# Patient Record
Sex: MALE | Race: WHITE | NOT HISPANIC OR LATINO | Employment: OTHER | URBAN - METROPOLITAN AREA
[De-identification: names, ages, dates, MRNs, and addresses within clinical notes are randomized per-mention and may not be internally consistent; named-entity substitution may affect disease eponyms.]

---

## 2022-11-28 ENCOUNTER — APPOINTMENT (OUTPATIENT)
Dept: GENERAL RADIOLOGY | Facility: CLINIC | Age: 42
End: 2022-11-28
Attending: EMERGENCY MEDICINE

## 2022-11-28 ENCOUNTER — HOSPITAL ENCOUNTER (OUTPATIENT)
Facility: CLINIC | Age: 42
Setting detail: OBSERVATION
Discharge: HOME | End: 2022-11-29
Attending: EMERGENCY MEDICINE | Admitting: INTERNAL MEDICINE

## 2022-11-28 DIAGNOSIS — L02.511 ABSCESS OF RIGHT MIDDLE FINGER: ICD-10-CM

## 2022-11-28 DIAGNOSIS — L03.011 CELLULITIS OF RIGHT MIDDLE FINGER: ICD-10-CM

## 2022-11-28 LAB
ANION GAP SERPL CALCULATED.3IONS-SCNC: 4 MMOL/L (ref 3–14)
BASOPHILS # BLD AUTO: 0.1 10E3/UL (ref 0–0.2)
BASOPHILS NFR BLD AUTO: 1 %
BUN SERPL-MCNC: 17 MG/DL (ref 7–30)
CALCIUM SERPL-MCNC: 9.2 MG/DL (ref 8.5–10.1)
CHLORIDE BLD-SCNC: 103 MMOL/L (ref 94–109)
CO2 SERPL-SCNC: 27 MMOL/L (ref 20–32)
CREAT SERPL-MCNC: 0.92 MG/DL (ref 0.66–1.25)
CRP SERPL-MCNC: 6.1 MG/L (ref 0–8)
EOSINOPHIL # BLD AUTO: 0.1 10E3/UL (ref 0–0.7)
EOSINOPHIL NFR BLD AUTO: 1 %
ERYTHROCYTE [DISTWIDTH] IN BLOOD BY AUTOMATED COUNT: 13.3 % (ref 10–15)
GFR SERPL CREATININE-BSD FRML MDRD: >90 ML/MIN/1.73M2
GLUCOSE BLD-MCNC: 117 MG/DL (ref 70–99)
HCT VFR BLD AUTO: 49 % (ref 40–53)
HGB BLD-MCNC: 16.6 G/DL (ref 13.3–17.7)
IMM GRANULOCYTES # BLD: 0.1 10E3/UL
IMM GRANULOCYTES NFR BLD: 0 %
LYMPHOCYTES # BLD AUTO: 1.7 10E3/UL (ref 0.8–5.3)
LYMPHOCYTES NFR BLD AUTO: 13 %
MCH RBC QN AUTO: 29.6 PG (ref 26.5–33)
MCHC RBC AUTO-ENTMCNC: 33.9 G/DL (ref 31.5–36.5)
MCV RBC AUTO: 87 FL (ref 78–100)
MONOCYTES # BLD AUTO: 1.1 10E3/UL (ref 0–1.3)
MONOCYTES NFR BLD AUTO: 9 %
NEUTROPHILS # BLD AUTO: 9.6 10E3/UL (ref 1.6–8.3)
NEUTROPHILS NFR BLD AUTO: 76 %
NRBC # BLD AUTO: 0 10E3/UL
NRBC BLD AUTO-RTO: 0 /100
PLATELET # BLD AUTO: 284 10E3/UL (ref 150–450)
POTASSIUM BLD-SCNC: 3.8 MMOL/L (ref 3.4–5.3)
RBC # BLD AUTO: 5.61 10E6/UL (ref 4.4–5.9)
SARS-COV-2 RNA RESP QL NAA+PROBE: NEGATIVE
SODIUM SERPL-SCNC: 134 MMOL/L (ref 133–144)
WBC # BLD AUTO: 12.6 10E3/UL (ref 4–11)

## 2022-11-28 PROCEDURE — U0005 INFEC AGEN DETEC AMPLI PROBE: HCPCS | Performed by: EMERGENCY MEDICINE

## 2022-11-28 PROCEDURE — 87040 BLOOD CULTURE FOR BACTERIA: CPT | Performed by: EMERGENCY MEDICINE

## 2022-11-28 PROCEDURE — G0378 HOSPITAL OBSERVATION PER HR: HCPCS

## 2022-11-28 PROCEDURE — 250N000013 HC RX MED GY IP 250 OP 250 PS 637: Performed by: INTERNAL MEDICINE

## 2022-11-28 PROCEDURE — 96367 TX/PROPH/DG ADDL SEQ IV INF: CPT

## 2022-11-28 PROCEDURE — 99285 EMERGENCY DEPT VISIT HI MDM: CPT | Mod: 25

## 2022-11-28 PROCEDURE — 250N000009 HC RX 250: Performed by: PHYSICIAN ASSISTANT

## 2022-11-28 PROCEDURE — 250N000011 HC RX IP 250 OP 636: Performed by: INTERNAL MEDICINE

## 2022-11-28 PROCEDURE — 36415 COLL VENOUS BLD VENIPUNCTURE: CPT | Performed by: EMERGENCY MEDICINE

## 2022-11-28 PROCEDURE — 96366 THER/PROPH/DIAG IV INF ADDON: CPT

## 2022-11-28 PROCEDURE — 73140 X-RAY EXAM OF FINGER(S): CPT | Mod: RT

## 2022-11-28 PROCEDURE — C9803 HOPD COVID-19 SPEC COLLECT: HCPCS

## 2022-11-28 PROCEDURE — 250N000011 HC RX IP 250 OP 636: Performed by: EMERGENCY MEDICINE

## 2022-11-28 PROCEDURE — 250N000013 HC RX MED GY IP 250 OP 250 PS 637: Performed by: EMERGENCY MEDICINE

## 2022-11-28 PROCEDURE — 258N000003 HC RX IP 258 OP 636: Performed by: EMERGENCY MEDICINE

## 2022-11-28 PROCEDURE — 99220 PR INITIAL OBSERVATION CARE,LEVEL III: CPT | Performed by: INTERNAL MEDICINE

## 2022-11-28 PROCEDURE — 85014 HEMATOCRIT: CPT | Performed by: EMERGENCY MEDICINE

## 2022-11-28 PROCEDURE — 96365 THER/PROPH/DIAG IV INF INIT: CPT

## 2022-11-28 PROCEDURE — 80048 BASIC METABOLIC PNL TOTAL CA: CPT | Performed by: EMERGENCY MEDICINE

## 2022-11-28 PROCEDURE — 86140 C-REACTIVE PROTEIN: CPT | Performed by: EMERGENCY MEDICINE

## 2022-11-28 PROCEDURE — 94640 AIRWAY INHALATION TREATMENT: CPT

## 2022-11-28 RX ORDER — ACETAMINOPHEN 650 MG/1
650 SUPPOSITORY RECTAL EVERY 6 HOURS PRN
Status: DISCONTINUED | OUTPATIENT
Start: 2022-11-28 | End: 2022-11-29 | Stop reason: HOSPADM

## 2022-11-28 RX ORDER — AMOXICILLIN 250 MG
1 CAPSULE ORAL 2 TIMES DAILY PRN
Status: DISCONTINUED | OUTPATIENT
Start: 2022-11-28 | End: 2022-11-29 | Stop reason: HOSPADM

## 2022-11-28 RX ORDER — VANCOMYCIN HYDROCHLORIDE 1 G/200ML
1000 INJECTION, SOLUTION INTRAVENOUS EVERY 12 HOURS
Status: DISCONTINUED | OUTPATIENT
Start: 2022-11-29 | End: 2022-11-28 | Stop reason: CLARIF

## 2022-11-28 RX ORDER — PIPERACILLIN SODIUM, TAZOBACTAM SODIUM 3; .375 G/15ML; G/15ML
3.38 INJECTION, POWDER, LYOPHILIZED, FOR SOLUTION INTRAVENOUS EVERY 6 HOURS
Status: DISCONTINUED | OUTPATIENT
Start: 2022-11-28 | End: 2022-11-29 | Stop reason: HOSPADM

## 2022-11-28 RX ORDER — AMOXICILLIN 250 MG
2 CAPSULE ORAL 2 TIMES DAILY PRN
Status: DISCONTINUED | OUTPATIENT
Start: 2022-11-28 | End: 2022-11-29 | Stop reason: HOSPADM

## 2022-11-28 RX ORDER — ONDANSETRON 4 MG/1
4 TABLET, ORALLY DISINTEGRATING ORAL EVERY 6 HOURS PRN
Status: DISCONTINUED | OUTPATIENT
Start: 2022-11-28 | End: 2022-11-29 | Stop reason: HOSPADM

## 2022-11-28 RX ORDER — ONDANSETRON 2 MG/ML
4 INJECTION INTRAMUSCULAR; INTRAVENOUS EVERY 6 HOURS PRN
Status: DISCONTINUED | OUTPATIENT
Start: 2022-11-28 | End: 2022-11-29 | Stop reason: HOSPADM

## 2022-11-28 RX ORDER — SULFAMETHOXAZOLE/TRIMETHOPRIM 800-160 MG
2 TABLET ORAL ONCE
Status: COMPLETED | OUTPATIENT
Start: 2022-11-28 | End: 2022-11-28

## 2022-11-28 RX ORDER — ALBUTEROL SULFATE 90 UG/1
2 AEROSOL, METERED RESPIRATORY (INHALATION) EVERY 4 HOURS PRN
Status: DISCONTINUED | OUTPATIENT
Start: 2022-11-28 | End: 2022-11-29 | Stop reason: HOSPADM

## 2022-11-28 RX ORDER — GINSENG 100 MG
CAPSULE ORAL DAILY PRN
Status: DISCONTINUED | OUTPATIENT
Start: 2022-11-28 | End: 2022-11-29 | Stop reason: HOSPADM

## 2022-11-28 RX ORDER — CEFTAZIDIME 2 G/1
2 INJECTION, POWDER, FOR SOLUTION INTRAVENOUS ONCE
Status: DISCONTINUED | OUTPATIENT
Start: 2022-11-28 | End: 2022-11-28

## 2022-11-28 RX ORDER — SULFAMETHOXAZOLE/TRIMETHOPRIM 800-160 MG
1 TABLET ORAL 2 TIMES DAILY
Qty: 14 TABLET | Refills: 0 | Status: SHIPPED | OUTPATIENT
Start: 2022-11-28 | End: 2022-11-29

## 2022-11-28 RX ORDER — PROCHLORPERAZINE MALEATE 10 MG
10 TABLET ORAL EVERY 6 HOURS PRN
Status: DISCONTINUED | OUTPATIENT
Start: 2022-11-28 | End: 2022-11-29 | Stop reason: HOSPADM

## 2022-11-28 RX ORDER — PROCHLORPERAZINE 25 MG
25 SUPPOSITORY, RECTAL RECTAL EVERY 12 HOURS PRN
Status: DISCONTINUED | OUTPATIENT
Start: 2022-11-28 | End: 2022-11-29 | Stop reason: HOSPADM

## 2022-11-28 RX ORDER — POLYETHYLENE GLYCOL 3350 17 G/17G
17 POWDER, FOR SOLUTION ORAL DAILY PRN
Status: DISCONTINUED | OUTPATIENT
Start: 2022-11-28 | End: 2022-11-29 | Stop reason: HOSPADM

## 2022-11-28 RX ORDER — CEFTAZIDIME 2 G/1
2 INJECTION, POWDER, FOR SOLUTION INTRAVENOUS EVERY 8 HOURS
Status: CANCELLED | OUTPATIENT
Start: 2022-11-28

## 2022-11-28 RX ORDER — ALBUTEROL SULFATE 90 UG/1
2 AEROSOL, METERED RESPIRATORY (INHALATION) EVERY 6 HOURS PRN
COMMUNITY

## 2022-11-28 RX ORDER — PIPERACILLIN SODIUM, TAZOBACTAM SODIUM 4; .5 G/20ML; G/20ML
4.5 INJECTION, POWDER, LYOPHILIZED, FOR SOLUTION INTRAVENOUS ONCE
Status: COMPLETED | OUTPATIENT
Start: 2022-11-28 | End: 2022-11-28

## 2022-11-28 RX ORDER — IBUPROFEN 600 MG/1
600 TABLET, FILM COATED ORAL ONCE
Status: COMPLETED | OUTPATIENT
Start: 2022-11-28 | End: 2022-11-28

## 2022-11-28 RX ORDER — ACETAMINOPHEN 325 MG/1
650 TABLET ORAL EVERY 6 HOURS PRN
Status: DISCONTINUED | OUTPATIENT
Start: 2022-11-28 | End: 2022-11-29 | Stop reason: HOSPADM

## 2022-11-28 RX ADMIN — SULFAMETHOXAZOLE AND TRIMETHOPRIM 2 TABLET: 800; 160 TABLET ORAL at 13:37

## 2022-11-28 RX ADMIN — PIPERACILLIN AND TAZOBACTAM 4.5 G: 4; .5 INJECTION, POWDER, FOR SOLUTION INTRAVENOUS at 14:02

## 2022-11-28 RX ADMIN — ACETAMINOPHEN 650 MG: 325 TABLET, FILM COATED ORAL at 21:46

## 2022-11-28 RX ADMIN — AMOXICILLIN AND CLAVULANATE POTASSIUM 1 TABLET: 875; 125 TABLET, FILM COATED ORAL at 12:28

## 2022-11-28 RX ADMIN — VANCOMYCIN HYDROCHLORIDE 2000 MG: 10 INJECTION, POWDER, LYOPHILIZED, FOR SOLUTION INTRAVENOUS at 14:51

## 2022-11-28 RX ADMIN — IBUPROFEN 600 MG: 600 TABLET ORAL at 12:29

## 2022-11-28 RX ADMIN — BACITRACIN: 500 OINTMENT TOPICAL at 23:23

## 2022-11-28 RX ADMIN — PIPERACILLIN AND TAZOBACTAM 3.38 G: 3; .375 INJECTION, POWDER, FOR SOLUTION INTRAVENOUS at 19:31

## 2022-11-28 ASSESSMENT — ENCOUNTER SYMPTOMS
WOUND: 1
FEVER: 0

## 2022-11-28 ASSESSMENT — ACTIVITIES OF DAILY LIVING (ADL)
ADLS_ACUITY_SCORE: 35
ADLS_ACUITY_SCORE: 33
ADLS_ACUITY_SCORE: 35

## 2022-11-28 NOTE — ED PROVIDER NOTES
History   Chief Complaint:  Hand Pain       The history is provided by the patient.      Jerald Padron is a right handed 42 year old otherwise healthy male who presents with right upper middle finger swelling, erythema, and pain. He first noticed the swelling on Thursday but it was much smaller and less severe. He states he sat in the hot tub with his hand submerged, soaked his finger in warm water, and applied cream all without any relief. As the swelling and erythema continued to spread he claims he tried to poke at it with a needle to try to drain it. Now the pressure in his finger is so great that it is causing diffuse right hand pain. Denies significant purulent exudate when attempting to drain finger. Denies fever. Has not applied ice.     Review of Systems   Constitutional: Negative for fever.   Skin: Positive for wound (Right middle finger ).   All other systems reviewed and are negative.    Allergies:  The patient has no known allergies.     Medications:  Albuterol inhaler     Past Medical History:     Asthma   GERD  Deviated nasal septum     Past Surgical History:    Maybeury teeth extraction     Family History:    This patient has no significant family history.     Social History:  The patient presents to the ED alone.  PCP: No Ref-Primary, Physician     Physical Exam     Patient Vitals for the past 24 hrs:   BP Temp Temp src Pulse Resp SpO2   11/28/22 1149 (!) 149/89 -- -- -- -- --   11/28/22 1148 -- 97.6  F (36.4  C) Temporal 77 18 98 %       Physical Exam  General: Patient is alert and normal appearing.  HEENT: Head atraumatic   Chest: Heart regular rate and rhythm.   Lungs: Equal clear to auscultation with no wheeze or rales  Abdomen: Soft, non tender, nondistended, normal bowel sounds  Back: No costovertebral angle tenderness, no midline C, T or L spine tenderness  Neuro: Grossly nonfocal, normal speech, strength equal bilaterally, CN 2-12 intact  Extremities: Right middle finger with fusiform  swelling and significant erythema and tenderness along the flexor tendon as well as the distal finger pulp with what appears to be an abscess on the lateral aspect of the finger.  Cap refill at the distal finger is less than 2 seconds.  Light touch sensation is intact.  In addition on the dorsal aspect of the hand there is some tracking redness up the hand.  Compartments are soft.  Skin: Warm and dry with no rash.       Emergency Department Course   Imaging:  XR Finger Right G/E 2 Views   Final Result   IMPRESSION: No fracture. No degenerative changes. No evidence of   osteomyelitis.      ADRIAN DEAN MD            SYSTEM ID:  [id number]        Report per radiology    Laboratory:  Labs Ordered and Resulted from Time of ED Arrival to Time of ED Departure   CBC WITH PLATELETS AND DIFFERENTIAL - Abnormal       Result Value    WBC Count 12.6 (*)     RBC Count 5.61      Hemoglobin 16.6      Hematocrit 49.0      MCV 87      MCH 29.6      MCHC 33.9      RDW 13.3      Platelet Count 284      % Neutrophils 76      % Lymphocytes 13      % Monocytes 9      % Eosinophils 1      % Basophils 1      % Immature Granulocytes 0      NRBCs per 100 WBC 0      Absolute Neutrophils 9.6 (*)     Absolute Lymphocytes 1.7      Absolute Monocytes 1.1      Absolute Eosinophils 0.1      Absolute Basophils 0.1      Absolute Immature Granulocytes 0.1      Absolute NRBCs 0.0     CRP INFLAMMATION   BASIC METABOLIC PANEL   COVID-19 VIRUS (CORONAVIRUS) BY PCR   BLOOD CULTURE   BLOOD CULTURE        Procedures    Incision and Drainage     Procedure: Incision and Drainage     Consent: Verbal    Indication: Abscess    Location: Extremity, upper    Size: 1 cm    Ultrasound Guidance: No    Preparation: Povidone-iodine     Procedure Detail:    Aspiration: No  Incision Type: Stab  Scalpel: 11  Wound Management: Left open   Packing: None     Patient Status: The patient tolerated the procedure well: Yes. There were no complications.     Emergency  Department Course:       Reviewed:  I reviewed nursing notes, vitals, past medical history and Care Everywhere    Assessments:  1213 I obtained history and examined the patient as noted above.   1220 I numbed the patient's finger   1230 I attempted to drain the finger.  1301 I left a message at [facility] hand specialty for patient follow up.  1315 I reassessed patient. I noted that he became near syncopal after sitting in the room.  Patient with continued tenderness along the flexor tendon as well as tracking up the dorsum of hand with erythema    Consults:  1344 I spoke with [facility] about patient symptoms and plan of care.   1345 I consulted with Dr. Farmer, hospitalist, regarding the patient's history and presentation here in the emergency department who accepted the patient for admission.    Interventions:  1228 Augmentin 875-125 mg oral  1229 Ibuprofen 600 mg oral   1337 Bactrim 800-160 mg x 2 oral   1350 Ceftaz 2g  1350 Vancomycin    Disposition:  The patient was admitted to the hospital under the care of Dr. Farmer.     Impression & Plan     Medical Decision Making:  Patient is a 42-year-old otherwise healthy male who presents to the emergency department with dominant hand right middle finger infection.  Patient attempted to drain himself at home by sticking needles in it as well as trying to open it in a hot tub.  Now with fusiform swelling, tenderness along the flexor tendon as well as erythema on the dorsal aspect of the hand and pain into the surrounding digits.  Compartments appear soft.  Fingers neurovascularly intact with light touch sensation and cap refill intact in the distal finger.  Attempted incision and drainage was made without significant pus resulted.  X-ray without evidence of osteomyelitis.  Patient with mild leukocytosis but afebrile.  Cultures are pending.  Discussed with orthopedics on-call and will plan to admit for IV antibiotics and possible washout.  Need their specialty evaluation.   Discussed with the hospitalist who kindly agrees to except the patient for admission    Diagnosis:    ICD-10-CM    1. Abscess of right middle finger  L02.511       2. Cellulitis of right middle finger  L03.011           Scribe Disclosure:  I, Margaux Garcia, am serving as a scribe at 12:17 PM on 11/28/2022 to document services personally performed by Polly Coon MD based on my observations and the provider's statements to me.        Polly Coon MD  11/28/22 0629

## 2022-11-28 NOTE — ED NOTES
"Rapid Assessment Note    History:   Jerald Padron is a right-handed 42 year old male who presents with swelling and erythema to the right third finger. He states that it started as a \"zit\" that he picked at and later developed erythema and swelling. He has soaked it in salt baths without relief. He denies any drainage.    Exam:   General:  Alert, interactive  Cardiovascular:  Well perfused  Lungs:  No respiratory distress, no accessory muscle use  Neuro:  Moving all 4 extremities  Skin:  Warm, dry. Redness, swelling, and pustular lesions on the distal aspect of the right third finger.  Psych:  Normal affect      Plan of Care:   I evaluated the patient and developed an initial plan of care. I discussed this plan and explained that I, or one of my partners, would be returning to complete the evaluation.         I, Jami Tran, am serving as a scribe to document services personally performed by Ariana Self MD, based on my observations and the provider's statements to me.    11/28/2022  [employer] [employer]    Portions of this medical record were completed by a scribe. UPON MY REVIEW AND AUTHENTICATION BY ELECTRONIC SIGNATURE, this confirms (a) I performed the applicable clinical services, and (b) the record is accurate.        Ariana Self MD  11/28/22 1200    "

## 2022-11-28 NOTE — CONSULTS
[facility]    Orthopedic Consultation    Jerald Padron MRN# [id number]   Age: 42 year old YOB: 1980     Date of Admission: 11/28/2022    Reason for consult: Right long distal finger infection       Requesting provider: Call from ED       Level of consult: Consult, follow and place orders           Assessment and Plan:   Assessment:   Right long finger distal finger infection      Plan:   Continue IV Abx  Begin hibiclens/warm water soaks bid  Elevate right hand when at rest  NPO midnight and recheck in am            Chief Complaint:   Right finger pain          History of Present Illness:   Mr. Padron is a 42-year-old male patient presented to the ED with right long finger pain.  The patient was in his usual state of health until when he woke up on Thursday morning (11/24) with right middle finger swelling, erythema and pain. He notes there was a small pustule on the side of his finger.   He tried to treat this himself by soaking his hand in warm water and also did poke at it with a needle to try to drain it.  The pain and redness progressively became worse over the last few days therefore presented to the ED.    The ED provider, Dr Coon, did attempt an I&D at bedside with little purulence.             Past Medical History:   No past medical history on file.          Past Surgical History:   No past surgical history on file.          Social History:     Social History     Tobacco Use     Smoking status: Not on file     Smokeless tobacco: Not on file   Substance Use Topics     Alcohol use: Not on file             Family History:   No family history on file.          Immunizations:     VACCINE/DOSE   Diptheria   DPT   DTAP   HBIG   Hepatitis A   Hepatitis B   HIB   Influenza   Measles   Meningococcal   MMR   Mumps   Pneumococcal   Polio   Rubella   Small Pox   TDAP   Varicella   Zoster             Allergies:   No Known Allergies          Medications:     Current  Facility-Administered Medications   Medication     vancomycin (VANCOCIN) 2,000 mg in 0.9% NaCl 500 mL intermittent infusion     Current Outpatient Medications   Medication Sig     albuterol (PROAIR HFA/PROVENTIL HFA/VENTOLIN HFA) 108 (90 Base) MCG/ACT inhaler Inhale 2 puffs into the lungs every 6 hours as needed for shortness of breath / dyspnea or wheezing     amoxicillin-clavulanate (AUGMENTIN) 875-125 MG tablet Take 1 tablet by mouth 2 times daily for 7 days     sulfamethoxazole-trimethoprim (BACTRIM DS) 800-160 MG tablet Take 1 tablet by mouth 2 times daily for 7 days             Review of Systems:   ROS:  10 point ROS neg other than the symptoms noted above in the HPI.            Physical Exam:   All vitals have been reviewed  Patient Vitals for the past 24 hrs:   BP Temp Temp src Pulse Resp SpO2 Weight   11/28/22 1408 -- -- -- -- -- -- 81.7 kg (180 lb 3.2 oz)   11/28/22 1149 (!) 149/89 -- -- -- -- -- --   11/28/22 1148 -- 97.6  F (36.4  C) Temporal 77 18 98 % --     No intake or output data in the 24 hours ending 11/28/22 1511      Physical Exam   Temp: 97.6  F (36.4  C) Temp src: Temporal BP: (!) 149/89 Pulse: 77   Resp: 18 SpO2: 98 % O2 Device: None (Room air)    Vital Signs with Ranges  Temp:  [97.6  F (36.4  C)] 97.6  F (36.4  C)  Pulse:  [77] 77  Resp:  [18] 18  BP: (149)/(89) 149/89  SpO2:  [98 %] 98 %  180 lbs 3.2 oz    Constitutional: Alert, following commands.  HEENT: Head atraumatic normocephalic. Pupils equal round and reactive to light.  Respiratory: Unlabored breathing no audible wheeze  Cardiovascular: Regular rate and rhythm per pulses  GI: Abdomen non-distended.  Lymph/Hematologic: No lymphadenopathy in areas examined  Genitourinary:  No carrington  Skin: No rashes, no cyanosis, no edema.  Musculoskeletal: On exam of the right long finger, erythema along the ulnar aspect of the finger distally with small pustule that has been lanced.  Patient able to flex and the DIP and PIP joints with mild  discomfort.  Minimal pain with resisted finger flexion.  TTP along the distal finger.  Sensation intact and equal.  Brisk cap refill.  No significant lymphangitis present.    Neurologic: normal without focal findings, Alert and oriented             Data:   All laboratory data reviewed  Results for orders placed or performed during the hospital encounter of 11/28/22   XR Finger Right G/E 2 Views     Status: None    Narrative    XR FINGER RIGHT G/E 2 VIEWS 11/28/2022 1:26 PM    HISTORY: swelling, finger infection    COMPARISON: None.      Impression    IMPRESSION: No fracture. No degenerative changes. No evidence of  osteomyelitis.    ADRIAN DEAN MD         SYSTEM ID:  [id number]   CRP inflammation     Status: Normal   Result Value Ref Range    CRP Inflammation 6.1 0.0 - 8.0 mg/L   Basic metabolic panel     Status: Abnormal   Result Value Ref Range    Sodium 134 133 - 144 mmol/L    Potassium 3.8 3.4 - 5.3 mmol/L    Chloride 103 94 - 109 mmol/L    Carbon Dioxide (CO2) 27 20 - 32 mmol/L    Anion Gap 4 3 - 14 mmol/L    Urea Nitrogen 17 7 - 30 mg/dL    Creatinine 0.92 0.66 - 1.25 mg/dL    Calcium 9.2 8.5 - 10.1 mg/dL    Glucose 117 (H) 70 - 99 mg/dL    GFR Estimate >90 >60 mL/min/1.73m2   CBC with platelets and differential     Status: Abnormal   Result Value Ref Range    WBC Count 12.6 (H) 4.0 - 11.0 10e3/uL    RBC Count 5.61 4.40 - 5.90 10e6/uL    Hemoglobin 16.6 13.3 - 17.7 g/dL    Hematocrit 49.0 40.0 - 53.0 %    MCV 87 78 - 100 fL    MCH 29.6 26.5 - 33.0 pg    MCHC 33.9 31.5 - 36.5 g/dL    RDW 13.3 10.0 - 15.0 %    Platelet Count 284 150 - 450 10e3/uL    % Neutrophils 76 %    % Lymphocytes 13 %    % Monocytes 9 %    % Eosinophils 1 %    % Basophils 1 %    % Immature Granulocytes 0 %    NRBCs per 100 WBC 0 <1 /100    Absolute Neutrophils 9.6 (H) 1.6 - 8.3 10e3/uL    Absolute Lymphocytes 1.7 0.8 - 5.3 10e3/uL    Absolute Monocytes 1.1 0.0 - 1.3 10e3/uL    Absolute Eosinophils 0.1 0.0 - 0.7 10e3/uL    Absolute  Basophils 0.1 0.0 - 0.2 10e3/uL    Absolute Immature Granulocytes 0.1 <=0.4 10e3/uL    Absolute NRBCs 0.0 10e3/uL   CBC with platelets differential     Status: Abnormal    Narrative    The following orders were created for panel order CBC with platelets differential.  Procedure                               Abnormality         Status                     ---------                               -----------         ------                     CBC with platelets and d...[[id number]]  Abnormal            Final result                 Please view results for these tests on the individual orders.          Attestation:  I have reviewed today's vital signs, notes, medications, labs and imaging with Dr. Vasquez.  Amount of time performed on this consult: 45 minutes.    Fernanda Perez PA-C        Private Vehicle

## 2022-11-28 NOTE — PHARMACY-ADMISSION MEDICATION HISTORY
Pharmacy Medication History  Admission medication history interview status for the 11/28/2022  admission is complete. See EPIC admission navigator for prior to admission medications     Location of Interview: Patient room  Medication history sources: Patient      Medication reconciliation completed by provider prior to medication history? No    Time spent in this activity: 5 minutes    Prior to Admission medications    Medication Sig Last Dose Taking? Auth Provider Long Term End Date   albuterol (PROAIR HFA/PROVENTIL HFA/VENTOLIN HFA) 108 (90 Base) MCG/ACT inhaler Inhale 2 puffs into the lungs every 6 hours as needed for shortness of breath / dyspnea or wheezing prn med Yes Unknown, Entered By History Yes    amoxicillin-clavulanate (AUGMENTIN) 875-125 MG tablet Take 1 tablet by mouth 2 times daily for 7 days  Yes Polly Coon MD  12/5/22   sulfamethoxazole-trimethoprim (BACTRIM DS) 800-160 MG tablet Take 1 tablet by mouth 2 times daily for 7 days  Yes Polly Coon MD  12/5/22       The information provided in this note is only as accurate as the sources available at the time of update(s)

## 2022-11-28 NOTE — ED NOTES
"[facility]  ED Nurse Handoff Report    ED Chief complaint: Hand Pain      ED Diagnosis:   Final diagnoses:   Abscess of right middle finger   Cellulitis of right middle finger       Code Status: Full Code    Allergies: No Known Allergies    Patient Story: presents with swelling and erythema to the right third finger. He states that it started as a \"zit\" that he picked at and later developed erythema and swelling. He has soaked it in salt baths without relief. He denies any drainage.  Focused Assessment:    Labs Ordered and Resulted from Time of ED Arrival to Time of ED Departure   BASIC METABOLIC PANEL - Abnormal       Result Value    Sodium 134      Potassium 3.8      Chloride 103      Carbon Dioxide (CO2) 27      Anion Gap 4      Urea Nitrogen 17      Creatinine 0.92      Calcium 9.2      Glucose 117 (*)     GFR Estimate >90     CBC WITH PLATELETS AND DIFFERENTIAL - Abnormal    WBC Count 12.6 (*)     RBC Count 5.61      Hemoglobin 16.6      Hematocrit 49.0      MCV 87      MCH 29.6      MCHC 33.9      RDW 13.3      Platelet Count 284      % Neutrophils 76      % Lymphocytes 13      % Monocytes 9      % Eosinophils 1      % Basophils 1      % Immature Granulocytes 0      NRBCs per 100 WBC 0      Absolute Neutrophils 9.6 (*)     Absolute Lymphocytes 1.7      Absolute Monocytes 1.1      Absolute Eosinophils 0.1      Absolute Basophils 0.1      Absolute Immature Granulocytes 0.1      Absolute NRBCs 0.0     CRP INFLAMMATION - Normal    CRP Inflammation 6.1     COVID-19 VIRUS (CORONAVIRUS) BY PCR   BLOOD CULTURE   BLOOD CULTURE     XR Finger Right G/E 2 Views   Final Result   IMPRESSION: No fracture. No degenerative changes. No evidence of   osteomyelitis.      ADRIAN EDAN MD            SYSTEM ID:  [id number]            Treatments and/or interventions provided: IV antibiotics. Kiana Davis  Patient's response to treatments and/or interventions: Tolerated     To be done/followed up on inpatient " unit:  Right 3rd finger swelling, redness outlined     Does this patient have any cognitive concerns?: none     Activity level - Baseline/Home:  Independent  Activity Level - Current:   Independent    Patient's Preferred language: English   Needed?: No    Isolation: None  Infection: Not Applicable  Patient tested for COVID 19 prior to admission: YES  Bariatric?: No    Vital Signs:   Vitals:    11/28/22 1148 11/28/22 1149 11/28/22 1408   BP:  (!) 149/89    Pulse: 77     Resp: 18     Temp: 97.6  F (36.4  C)     TempSrc: Temporal     SpO2: 98%     Weight:   81.7 kg (180 lb 3.2 oz)       Cardiac Rhythm:     Was the PSS-3 completed:   Yes  What interventions are required if any?               Family Comments: None   OBS brochure/video discussed/provided to patient/family: Yes                For the majority of the shift this patient's behavior was Green.   Behavioral interventions performed were None .    ED NURSE PHONE NUMBER: [phone]

## 2022-11-28 NOTE — ED TRIAGE NOTES
"  Patient has a red and swollen wound to his right middle finger.  Patient states \"it started as a zit\"  He has been soaking it at home.     "

## 2022-11-28 NOTE — H&P
INTERNAL MEDICINE H&P    H&P dictated:  #[id number]    Elver Farmer Jr., MD  [phone] (p)  Text Page  [place]

## 2022-11-28 NOTE — H&P
Admitted: 11/28/2022    PRIMARY CARE PROVIDER:  None at this time.    CHIEF COMPLAINT:  Right middle finger infection.    HISTORY OF PRESENT ILLNESS:  Mr. Padron is a 42-year-old male patient with history noted below.  He has been relatively healthy and presents with the above issues.  The patient was in his usual state of health until when he woke up on Thursday morning (11/24) with right middle finger swelling, erythema and pain.  He tried to treat this himself by soaking his hand in warm water and also tried some type of cream and also did poke at it with a needle to try to drain it; however, this progressively became worse over the last few days.  He subsequently presented [facility] for further evaluation.    She was seen in the ER by Dr. Coon.  On initial evaluation, vital signs show temperature 97.6, heart rate 77, blood pressure 149/89, respiration 18, O2 saturation 98% on room air.  Laboratory evaluation showed CBC with white count 12.6, otherwise normal. BMP normal.  He had x-rays of the right hand, which showed no signs of fracture or osteomyelitis.     Dr. Coon did attempt to perform incision and drainage, but not much material was able to be expressed out.  Overall, given his right middle finger infection, there was concern for deeper infection with spread to other digits and Orthopedics was called and recommendation was for IV antibiotics and admission was made.  The patient was initially given amoxicillin- clavulanate and sulfamethoxazole-trimethoprim.  Now that he is being admitted, the patient will be ordered piperacillin-tazobactam and vancomycin.    The patient did note feeling near syncopal after the procedure in the ER.  He also has had some chills and diaphoresis.  He has some nausea from pain.  No chest pain or shortness of breath.  No fevers.  No abdominal pain, nausea or bloody stools.    PAST MEDICAL HISTORY:     1.  History of asthma.  2.  History of gastroesophageal reflux  disease.  3.  History of nasal septum deviation.    PAST SURGICAL HISTORY:  Status post wisdom teeth extraction.    ALLERGIES:  NO KNOWN DRUG ALLERGIES.    HOME MEDICATIONS:   Prior to Admission Medications   Prescriptions Last Dose Informant Patient Reported? Taking?   albuterol (PROAIR HFA/PROVENTIL HFA/VENTOLIN HFA) 108 (90 Base) MCG/ACT inhaler prn med  Yes Yes   Sig: Inhale 2 puffs into the lungs every 6 hours as needed for shortness of breath / dyspnea or wheezing      Facility-Administered Medications: None       SOCIAL HISTORY:  The patient does not smoke or drink.  He quit drinking about 3 years ago.  He is not , does not have any children.  He works in the film industry and production.    FAMILY HISTORY:  Reviewed, not felt to be contributory.    REVIEW OF SYSTEMS:  As in the HPI.  Otherwise, 10-point systems negative.    PHYSICAL EXAMINATION:    VITAL SIGNS:  Temperature 97.6, heart rate 77, blood pressure 149/89, respiration 18, O2 saturation 98% on room air.  GENERAL APPEARANCE:  This is a 42-year-old male patient lying in bed, conversant and friendly.  HEENT:  Pupils equal, round, and reactive.  No scleral icterus or conjunctival injection.  Oropharynx: No gross erythema or exudate.  NECK:  No bruits, JVD, adenopathy.  HEART:  Regular rhythm without murmurs, rubs, or gallops.  LUNGS:  Clear, without crackles or wheeze.  ABDOMEN:  Soft, nontender, nondistended, positive bowel sounds.  EXTREMITIES:  No lower extremity edema.  His right middle finger has significant swelling and some erythema without obvious signs of any purulent drainage. Some erythema is extending on the dorsal aspect of his middle finger.  NEUROLOGIC:  No gross focal motor deficits.    LABORATORY AND IMAGING:    Results for orders placed or performed during the hospital encounter of 11/28/22   XR Finger Right G/E 2 Views     Status: None    Narrative    XR FINGER RIGHT G/E 2 VIEWS 11/28/2022 1:26 PM    HISTORY: swelling, finger  infection    COMPARISON: None.      Impression    IMPRESSION: No fracture. No degenerative changes. No evidence of  osteomyelitis.    ADRIAN DEAN MD         SYSTEM ID:  [id number]   CRP inflammation     Status: Normal   Result Value Ref Range    CRP Inflammation 6.1 0.0 - 8.0 mg/L   Basic metabolic panel     Status: Abnormal   Result Value Ref Range    Sodium 134 133 - 144 mmol/L    Potassium 3.8 3.4 - 5.3 mmol/L    Chloride 103 94 - 109 mmol/L    Carbon Dioxide (CO2) 27 20 - 32 mmol/L    Anion Gap 4 3 - 14 mmol/L    Urea Nitrogen 17 7 - 30 mg/dL    Creatinine 0.92 0.66 - 1.25 mg/dL    Calcium 9.2 8.5 - 10.1 mg/dL    Glucose 117 (H) 70 - 99 mg/dL    GFR Estimate >90 >60 mL/min/1.73m2   CBC with platelets and differential     Status: Abnormal   Result Value Ref Range    WBC Count 12.6 (H) 4.0 - 11.0 10e3/uL    RBC Count 5.61 4.40 - 5.90 10e6/uL    Hemoglobin 16.6 13.3 - 17.7 g/dL    Hematocrit 49.0 40.0 - 53.0 %    MCV 87 78 - 100 fL    MCH 29.6 26.5 - 33.0 pg    MCHC 33.9 31.5 - 36.5 g/dL    RDW 13.3 10.0 - 15.0 %    Platelet Count 284 150 - 450 10e3/uL    % Neutrophils 76 %    % Lymphocytes 13 %    % Monocytes 9 %    % Eosinophils 1 %    % Basophils 1 %    % Immature Granulocytes 0 %    NRBCs per 100 WBC 0 <1 /100    Absolute Neutrophils 9.6 (H) 1.6 - 8.3 10e3/uL    Absolute Lymphocytes 1.7 0.8 - 5.3 10e3/uL    Absolute Monocytes 1.1 0.0 - 1.3 10e3/uL    Absolute Eosinophils 0.1 0.0 - 0.7 10e3/uL    Absolute Basophils 0.1 0.0 - 0.2 10e3/uL    Absolute Immature Granulocytes 0.1 <=0.4 10e3/uL    Absolute NRBCs 0.0 10e3/uL   CBC with platelets differential     Status: Abnormal    Narrative    The following orders were created for panel order CBC with platelets differential.  Procedure                               Abnormality         Status                     ---------                               -----------         ------                     CBC with platelets and d...[[id number]]  Abnormal            Final  result                 Please view results for these tests on the individual orders.             ASSESSMENT AND PLAN:  Mr. Jerald Garcia is a 42-year-old male patient who is relatively healthy and presents with right middle finger swelling, erythema and pain and found with signs of infection/abscess.    On initial evaluation, vital signs show temperature 97.6, heart rate 77, blood pressure 149/89, respiration 18, O2 saturation 98% on room air.  Laboratory evaluation showed CBC with white count 12.6, otherwise normal. BMP normal.  He had x-rays of the right hand, which showed no signs of fracture or osteomyelitis.       1.  Right middle finger cellulitis with concern for underlying abscess.  Initial presentation as above.  I and D was attempted in the ER, but much was able to be drained.  At this time, will admit under observation.  We will continue piperacillin-tazobactam and vancomycin.  We will ask Orthopedic Surgery and Infectious Disease to see the patient.  The plan at this time is to see if this responds to IV antibiotics.  If not, he may need to undergo surgery.  We will order p.r.n. acetaminophen for pain.  We will monitor cultures.    2.  Asthma.  Continue p.r.n. albuterol.    3.  Prophylaxis:  Pneumoboots and ambulation.    CODE STATUS:  Full code.    Elver Farmer Jr., MD        D: 2022   T: 2022   MT: SAMANTA    Name:     JERALD GARCIA  MRN:      [id number]        Account:     [id number]   :      1980           Admitted:    2022       Document: [id number]

## 2022-11-29 ENCOUNTER — ANESTHESIA (OUTPATIENT)
Dept: SURGERY | Facility: CLINIC | Age: 42
End: 2022-11-29

## 2022-11-29 ENCOUNTER — ANESTHESIA EVENT (OUTPATIENT)
Dept: SURGERY | Facility: CLINIC | Age: 42
End: 2022-11-29

## 2022-11-29 VITALS
SYSTOLIC BLOOD PRESSURE: 131 MMHG | TEMPERATURE: 97.4 F | RESPIRATION RATE: 16 BRPM | WEIGHT: 180.2 LBS | HEIGHT: 71 IN | OXYGEN SATURATION: 97 % | BODY MASS INDEX: 25.23 KG/M2 | DIASTOLIC BLOOD PRESSURE: 86 MMHG | HEART RATE: 77 BPM

## 2022-11-29 LAB
CREAT SERPL-MCNC: 1.17 MG/DL (ref 0.66–1.25)
CRP SERPL-MCNC: 7.5 MG/L (ref 0–8)
ERYTHROCYTE [DISTWIDTH] IN BLOOD BY AUTOMATED COUNT: 13.6 % (ref 10–15)
GFR SERPL CREATININE-BSD FRML MDRD: 80 ML/MIN/1.73M2
GRAM STAIN RESULT: ABNORMAL
GRAM STAIN RESULT: ABNORMAL
HCT VFR BLD AUTO: 46 % (ref 40–53)
HGB BLD-MCNC: 15.2 G/DL (ref 13.3–17.7)
MCH RBC QN AUTO: 29.3 PG (ref 26.5–33)
MCHC RBC AUTO-ENTMCNC: 33 G/DL (ref 31.5–36.5)
MCV RBC AUTO: 89 FL (ref 78–100)
PLATELET # BLD AUTO: 231 10E3/UL (ref 150–450)
RBC # BLD AUTO: 5.18 10E6/UL (ref 4.4–5.9)
WBC # BLD AUTO: 10.6 10E3/UL (ref 4–11)

## 2022-11-29 PROCEDURE — 710N000009 HC RECOVERY PHASE 1, LEVEL 1, PER MIN: Performed by: ORTHOPAEDIC SURGERY

## 2022-11-29 PROCEDURE — 250N000009 HC RX 250: Performed by: ORTHOPAEDIC SURGERY

## 2022-11-29 PROCEDURE — 87077 CULTURE AEROBIC IDENTIFY: CPT | Performed by: ORTHOPAEDIC SURGERY

## 2022-11-29 PROCEDURE — 250N000011 HC RX IP 250 OP 636: Performed by: INTERNAL MEDICINE

## 2022-11-29 PROCEDURE — 250N000011 HC RX IP 250 OP 636: Performed by: NURSE ANESTHETIST, CERTIFIED REGISTERED

## 2022-11-29 PROCEDURE — 999N000141 HC STATISTIC PRE-PROCEDURE NURSING ASSESSMENT: Performed by: ORTHOPAEDIC SURGERY

## 2022-11-29 PROCEDURE — 96376 TX/PRO/DX INJ SAME DRUG ADON: CPT

## 2022-11-29 PROCEDURE — 36415 COLL VENOUS BLD VENIPUNCTURE: CPT | Performed by: PHYSICIAN ASSISTANT

## 2022-11-29 PROCEDURE — 250N000011 HC RX IP 250 OP 636: Performed by: ORTHOPAEDIC SURGERY

## 2022-11-29 PROCEDURE — 250N000013 HC RX MED GY IP 250 OP 250 PS 637: Performed by: PHYSICIAN ASSISTANT

## 2022-11-29 PROCEDURE — 250N000025 HC SEVOFLURANE, PER MIN: Performed by: ORTHOPAEDIC SURGERY

## 2022-11-29 PROCEDURE — 99217 PR OBSERVATION CARE DISCHARGE: CPT | Performed by: INTERNAL MEDICINE

## 2022-11-29 PROCEDURE — 250N000013 HC RX MED GY IP 250 OP 250 PS 637: Performed by: INTERNAL MEDICINE

## 2022-11-29 PROCEDURE — 258N000003 HC RX IP 258 OP 636: Performed by: INTERNAL MEDICINE

## 2022-11-29 PROCEDURE — 258N000003 HC RX IP 258 OP 636: Performed by: ANESTHESIOLOGY

## 2022-11-29 PROCEDURE — 250N000009 HC RX 250: Performed by: NURSE ANESTHETIST, CERTIFIED REGISTERED

## 2022-11-29 PROCEDURE — 87205 SMEAR GRAM STAIN: CPT | Performed by: ORTHOPAEDIC SURGERY

## 2022-11-29 PROCEDURE — 87075 CULTR BACTERIA EXCEPT BLOOD: CPT | Performed by: ORTHOPAEDIC SURGERY

## 2022-11-29 PROCEDURE — 85014 HEMATOCRIT: CPT | Performed by: PHYSICIAN ASSISTANT

## 2022-11-29 PROCEDURE — 99204 OFFICE O/P NEW MOD 45 MIN: CPT | Performed by: INTERNAL MEDICINE

## 2022-11-29 PROCEDURE — 82565 ASSAY OF CREATININE: CPT

## 2022-11-29 PROCEDURE — 96366 THER/PROPH/DIAG IV INF ADDON: CPT

## 2022-11-29 PROCEDURE — 360N000076 HC SURGERY LEVEL 3, PER MIN: Performed by: ORTHOPAEDIC SURGERY

## 2022-11-29 PROCEDURE — 370N000017 HC ANESTHESIA TECHNICAL FEE, PER MIN: Performed by: ORTHOPAEDIC SURGERY

## 2022-11-29 PROCEDURE — 99207 PR NO CHARGE LOS: CPT | Performed by: STUDENT IN AN ORGANIZED HEALTH CARE EDUCATION/TRAINING PROGRAM

## 2022-11-29 PROCEDURE — 272N000001 HC OR GENERAL SUPPLY STERILE: Performed by: ORTHOPAEDIC SURGERY

## 2022-11-29 PROCEDURE — G0378 HOSPITAL OBSERVATION PER HR: HCPCS

## 2022-11-29 PROCEDURE — 86140 C-REACTIVE PROTEIN: CPT | Performed by: PHYSICIAN ASSISTANT

## 2022-11-29 PROCEDURE — 96368 THER/DIAG CONCURRENT INF: CPT | Mod: 59

## 2022-11-29 PROCEDURE — 250N000011 HC RX IP 250 OP 636: Performed by: ANESTHESIOLOGY

## 2022-11-29 RX ORDER — OXYCODONE HYDROCHLORIDE 5 MG/1
5 TABLET ORAL EVERY 4 HOURS PRN
Status: DISCONTINUED | OUTPATIENT
Start: 2022-11-29 | End: 2022-11-29 | Stop reason: HOSPADM

## 2022-11-29 RX ORDER — IBUPROFEN 600 MG/1
600 TABLET, FILM COATED ORAL EVERY 6 HOURS PRN
Status: DISCONTINUED | OUTPATIENT
Start: 2022-11-29 | End: 2022-11-29 | Stop reason: HOSPADM

## 2022-11-29 RX ORDER — ACETAMINOPHEN 325 MG/1
650 TABLET ORAL EVERY 4 HOURS PRN
Status: DISCONTINUED | OUTPATIENT
Start: 2022-12-02 | End: 2022-11-29

## 2022-11-29 RX ORDER — ONDANSETRON 4 MG/1
4 TABLET, ORALLY DISINTEGRATING ORAL EVERY 6 HOURS PRN
Qty: 5 TABLET | Refills: 0 | Status: SHIPPED | OUTPATIENT
Start: 2022-11-29

## 2022-11-29 RX ORDER — CEFAZOLIN SODIUM 2 G/100ML
2 INJECTION, SOLUTION INTRAVENOUS SEE ADMIN INSTRUCTIONS
Status: DISCONTINUED | OUTPATIENT
Start: 2022-11-29 | End: 2022-11-29

## 2022-11-29 RX ORDER — OXYCODONE HYDROCHLORIDE 5 MG/1
5 TABLET ORAL EVERY 6 HOURS PRN
Qty: 10 TABLET | Refills: 0 | Status: SHIPPED | OUTPATIENT
Start: 2022-11-29 | End: 2022-12-02

## 2022-11-29 RX ORDER — HYDROMORPHONE HCL IN WATER/PF 6 MG/30 ML
0.2 PATIENT CONTROLLED ANALGESIA SYRINGE INTRAVENOUS
Status: DISCONTINUED | OUTPATIENT
Start: 2022-11-29 | End: 2022-11-29 | Stop reason: HOSPADM

## 2022-11-29 RX ORDER — HYDROMORPHONE HCL IN WATER/PF 6 MG/30 ML
0.2 PATIENT CONTROLLED ANALGESIA SYRINGE INTRAVENOUS EVERY 5 MIN PRN
Status: DISCONTINUED | OUTPATIENT
Start: 2022-11-29 | End: 2022-11-29 | Stop reason: HOSPADM

## 2022-11-29 RX ORDER — VANCOMYCIN HYDROCHLORIDE 1 G/200ML
1000 INJECTION, SOLUTION INTRAVENOUS EVERY 12 HOURS
Status: DISCONTINUED | OUTPATIENT
Start: 2022-11-30 | End: 2022-11-29 | Stop reason: HOSPADM

## 2022-11-29 RX ORDER — ONDANSETRON 4 MG/1
4 TABLET, ORALLY DISINTEGRATING ORAL EVERY 6 HOURS PRN
Status: DISCONTINUED | OUTPATIENT
Start: 2022-11-29 | End: 2022-11-29

## 2022-11-29 RX ORDER — NALOXONE HYDROCHLORIDE 0.4 MG/ML
0.2 INJECTION, SOLUTION INTRAMUSCULAR; INTRAVENOUS; SUBCUTANEOUS
Status: DISCONTINUED | OUTPATIENT
Start: 2022-11-29 | End: 2022-11-29 | Stop reason: HOSPADM

## 2022-11-29 RX ORDER — ACETAMINOPHEN 325 MG/1
650 TABLET ORAL EVERY 6 HOURS PRN
Qty: 112 TABLET | Refills: 0 | Status: SHIPPED | OUTPATIENT
Start: 2022-11-29 | End: 2022-12-13

## 2022-11-29 RX ORDER — ACETAMINOPHEN 325 MG/1
975 TABLET ORAL EVERY 8 HOURS
Status: DISCONTINUED | OUTPATIENT
Start: 2022-11-29 | End: 2022-11-29 | Stop reason: HOSPADM

## 2022-11-29 RX ORDER — NALOXONE HYDROCHLORIDE 0.4 MG/ML
0.4 INJECTION, SOLUTION INTRAMUSCULAR; INTRAVENOUS; SUBCUTANEOUS
Status: DISCONTINUED | OUTPATIENT
Start: 2022-11-29 | End: 2022-11-29 | Stop reason: HOSPADM

## 2022-11-29 RX ORDER — ONDANSETRON 2 MG/ML
INJECTION INTRAMUSCULAR; INTRAVENOUS PRN
Status: DISCONTINUED | OUTPATIENT
Start: 2022-11-29 | End: 2022-11-29

## 2022-11-29 RX ORDER — CEFAZOLIN SODIUM 2 G/100ML
2 INJECTION, SOLUTION INTRAVENOUS
Status: DISCONTINUED | OUTPATIENT
Start: 2022-11-29 | End: 2022-11-29

## 2022-11-29 RX ORDER — HYDROMORPHONE HCL IN WATER/PF 6 MG/30 ML
0.4 PATIENT CONTROLLED ANALGESIA SYRINGE INTRAVENOUS EVERY 5 MIN PRN
Status: DISCONTINUED | OUTPATIENT
Start: 2022-11-29 | End: 2022-11-29 | Stop reason: HOSPADM

## 2022-11-29 RX ORDER — SODIUM CHLORIDE, SODIUM LACTATE, POTASSIUM CHLORIDE, CALCIUM CHLORIDE 600; 310; 30; 20 MG/100ML; MG/100ML; MG/100ML; MG/100ML
INJECTION, SOLUTION INTRAVENOUS CONTINUOUS
Status: DISCONTINUED | OUTPATIENT
Start: 2022-11-29 | End: 2022-11-29 | Stop reason: HOSPADM

## 2022-11-29 RX ORDER — FENTANYL CITRATE 50 UG/ML
INJECTION, SOLUTION INTRAMUSCULAR; INTRAVENOUS PRN
Status: DISCONTINUED | OUTPATIENT
Start: 2022-11-29 | End: 2022-11-29

## 2022-11-29 RX ORDER — SULFAMETHOXAZOLE/TRIMETHOPRIM 800-160 MG
1 TABLET ORAL 2 TIMES DAILY
Qty: 14 TABLET | Refills: 0 | Status: SHIPPED | OUTPATIENT
Start: 2022-11-29 | End: 2022-12-06

## 2022-11-29 RX ORDER — LIDOCAINE 40 MG/G
CREAM TOPICAL
Status: DISCONTINUED | OUTPATIENT
Start: 2022-11-29 | End: 2022-11-29 | Stop reason: HOSPADM

## 2022-11-29 RX ORDER — CEFAZOLIN SODIUM 2 G/100ML
2 INJECTION, SOLUTION INTRAVENOUS EVERY 8 HOURS
Status: DISCONTINUED | OUTPATIENT
Start: 2022-11-29 | End: 2022-11-29

## 2022-11-29 RX ORDER — ONDANSETRON 4 MG/1
4 TABLET, ORALLY DISINTEGRATING ORAL EVERY 30 MIN PRN
Status: DISCONTINUED | OUTPATIENT
Start: 2022-11-29 | End: 2022-11-29 | Stop reason: HOSPADM

## 2022-11-29 RX ORDER — ONDANSETRON 2 MG/ML
4 INJECTION INTRAMUSCULAR; INTRAVENOUS EVERY 6 HOURS PRN
Status: DISCONTINUED | OUTPATIENT
Start: 2022-11-29 | End: 2022-11-29

## 2022-11-29 RX ORDER — BISACODYL 10 MG
10 SUPPOSITORY, RECTAL RECTAL DAILY PRN
Status: DISCONTINUED | OUTPATIENT
Start: 2022-11-29 | End: 2022-11-29 | Stop reason: HOSPADM

## 2022-11-29 RX ORDER — DEXAMETHASONE SODIUM PHOSPHATE 4 MG/ML
INJECTION, SOLUTION INTRA-ARTICULAR; INTRALESIONAL; INTRAMUSCULAR; INTRAVENOUS; SOFT TISSUE PRN
Status: DISCONTINUED | OUTPATIENT
Start: 2022-11-29 | End: 2022-11-29

## 2022-11-29 RX ORDER — FENTANYL CITRATE 0.05 MG/ML
50 INJECTION, SOLUTION INTRAMUSCULAR; INTRAVENOUS EVERY 5 MIN PRN
Status: DISCONTINUED | OUTPATIENT
Start: 2022-11-29 | End: 2022-11-29 | Stop reason: HOSPADM

## 2022-11-29 RX ORDER — HYDROCODONE BITARTRATE AND ACETAMINOPHEN 5; 325 MG/1; MG/1
1-2 TABLET ORAL EVERY 6 HOURS PRN
Status: DISCONTINUED | OUTPATIENT
Start: 2022-11-29 | End: 2022-11-29

## 2022-11-29 RX ORDER — OXYCODONE HYDROCHLORIDE 5 MG/1
10 TABLET ORAL EVERY 4 HOURS PRN
Status: DISCONTINUED | OUTPATIENT
Start: 2022-11-29 | End: 2022-11-29 | Stop reason: HOSPADM

## 2022-11-29 RX ORDER — AMOXICILLIN 250 MG
1 CAPSULE ORAL 2 TIMES DAILY
Status: DISCONTINUED | OUTPATIENT
Start: 2022-11-29 | End: 2022-11-29 | Stop reason: HOSPADM

## 2022-11-29 RX ORDER — PROPOFOL 10 MG/ML
INJECTION, EMULSION INTRAVENOUS CONTINUOUS PRN
Status: DISCONTINUED | OUTPATIENT
Start: 2022-11-29 | End: 2022-11-29

## 2022-11-29 RX ORDER — ONDANSETRON 2 MG/ML
4 INJECTION INTRAMUSCULAR; INTRAVENOUS EVERY 30 MIN PRN
Status: DISCONTINUED | OUTPATIENT
Start: 2022-11-29 | End: 2022-11-29 | Stop reason: HOSPADM

## 2022-11-29 RX ORDER — FENTANYL CITRATE 0.05 MG/ML
25 INJECTION, SOLUTION INTRAMUSCULAR; INTRAVENOUS EVERY 5 MIN PRN
Status: DISCONTINUED | OUTPATIENT
Start: 2022-11-29 | End: 2022-11-29 | Stop reason: HOSPADM

## 2022-11-29 RX ORDER — DEXMEDETOMIDINE HYDROCHLORIDE 4 UG/ML
INJECTION, SOLUTION INTRAVENOUS PRN
Status: DISCONTINUED | OUTPATIENT
Start: 2022-11-29 | End: 2022-11-29

## 2022-11-29 RX ORDER — PROCHLORPERAZINE MALEATE 10 MG
10 TABLET ORAL EVERY 6 HOURS PRN
Status: DISCONTINUED | OUTPATIENT
Start: 2022-11-29 | End: 2022-11-29

## 2022-11-29 RX ORDER — POLYETHYLENE GLYCOL 3350 17 G/17G
17 POWDER, FOR SOLUTION ORAL DAILY
Status: DISCONTINUED | OUTPATIENT
Start: 2022-11-30 | End: 2022-11-29 | Stop reason: HOSPADM

## 2022-11-29 RX ORDER — LIDOCAINE HYDROCHLORIDE 20 MG/ML
INJECTION, SOLUTION INFILTRATION; PERINEURAL PRN
Status: DISCONTINUED | OUTPATIENT
Start: 2022-11-29 | End: 2022-11-29

## 2022-11-29 RX ORDER — PROPOFOL 10 MG/ML
INJECTION, EMULSION INTRAVENOUS PRN
Status: DISCONTINUED | OUTPATIENT
Start: 2022-11-29 | End: 2022-11-29

## 2022-11-29 RX ORDER — HYDROMORPHONE HCL IN WATER/PF 6 MG/30 ML
0.4 PATIENT CONTROLLED ANALGESIA SYRINGE INTRAVENOUS
Status: DISCONTINUED | OUTPATIENT
Start: 2022-11-29 | End: 2022-11-29 | Stop reason: HOSPADM

## 2022-11-29 RX ADMIN — FENTANYL CITRATE 50 MCG: 50 INJECTION, SOLUTION INTRAMUSCULAR; INTRAVENOUS at 14:11

## 2022-11-29 RX ADMIN — PIPERACILLIN AND TAZOBACTAM 3.38 G: 3; .375 INJECTION, POWDER, FOR SOLUTION INTRAVENOUS at 01:47

## 2022-11-29 RX ADMIN — ONDANSETRON 4 MG: 2 INJECTION INTRAMUSCULAR; INTRAVENOUS at 14:32

## 2022-11-29 RX ADMIN — PROPOFOL 50 MCG/KG/MIN: 10 INJECTION, EMULSION INTRAVENOUS at 14:12

## 2022-11-29 RX ADMIN — PIPERACILLIN AND TAZOBACTAM 3.38 G: 3; .375 INJECTION, POWDER, FOR SOLUTION INTRAVENOUS at 08:40

## 2022-11-29 RX ADMIN — HYDROMORPHONE HYDROCHLORIDE 0.4 MG: 0.2 INJECTION, SOLUTION INTRAMUSCULAR; INTRAVENOUS; SUBCUTANEOUS at 15:17

## 2022-11-29 RX ADMIN — FENTANYL CITRATE 50 MCG: 50 INJECTION, SOLUTION INTRAMUSCULAR; INTRAVENOUS at 14:01

## 2022-11-29 RX ADMIN — PROPOFOL 200 MG: 10 INJECTION, EMULSION INTRAVENOUS at 14:11

## 2022-11-29 RX ADMIN — ACETAMINOPHEN 650 MG: 325 TABLET, FILM COATED ORAL at 03:30

## 2022-11-29 RX ADMIN — SODIUM CHLORIDE, POTASSIUM CHLORIDE, SODIUM LACTATE AND CALCIUM CHLORIDE: 600; 310; 30; 20 INJECTION, SOLUTION INTRAVENOUS at 14:00

## 2022-11-29 RX ADMIN — LIDOCAINE HYDROCHLORIDE 100 MG: 20 INJECTION, SOLUTION INFILTRATION; PERINEURAL at 14:11

## 2022-11-29 RX ADMIN — SUCCINYLCHOLINE CHLORIDE 120 MG: 20 INJECTION, SOLUTION INTRAMUSCULAR; INTRAVENOUS; PARENTERAL at 14:11

## 2022-11-29 RX ADMIN — PIPERACILLIN AND TAZOBACTAM 3.38 G: 3; .375 INJECTION, POWDER, FOR SOLUTION INTRAVENOUS at 13:55

## 2022-11-29 RX ADMIN — DEXMEDETOMIDINE HYDROCHLORIDE 20 MCG: 200 INJECTION INTRAVENOUS at 14:21

## 2022-11-29 RX ADMIN — DEXAMETHASONE SODIUM PHOSPHATE 4 MG: 4 INJECTION, SOLUTION INTRA-ARTICULAR; INTRALESIONAL; INTRAMUSCULAR; INTRAVENOUS; SOFT TISSUE at 14:18

## 2022-11-29 RX ADMIN — VANCOMYCIN HYDROCHLORIDE 1000 MG: 10 INJECTION, POWDER, LYOPHILIZED, FOR SOLUTION INTRAVENOUS at 16:25

## 2022-11-29 RX ADMIN — MIDAZOLAM 2 MG: 1 INJECTION INTRAMUSCULAR; INTRAVENOUS at 13:59

## 2022-11-29 RX ADMIN — HYDROMORPHONE HYDROCHLORIDE 0.4 MG: 0.2 INJECTION, SOLUTION INTRAMUSCULAR; INTRAVENOUS; SUBCUTANEOUS at 15:09

## 2022-11-29 RX ADMIN — ANTISEPTIC SURGICAL SCRUB: 0.04 SOLUTION TOPICAL at 03:36

## 2022-11-29 RX ADMIN — VANCOMYCIN HYDROCHLORIDE 1000 MG: 10 INJECTION, POWDER, LYOPHILIZED, FOR SOLUTION INTRAVENOUS at 02:25

## 2022-11-29 RX ADMIN — HYDROMORPHONE HYDROCHLORIDE 0.4 MG: 0.2 INJECTION, SOLUTION INTRAMUSCULAR; INTRAVENOUS; SUBCUTANEOUS at 15:44

## 2022-11-29 RX ADMIN — FENTANYL CITRATE 50 MCG: 50 INJECTION, SOLUTION INTRAMUSCULAR; INTRAVENOUS at 14:58

## 2022-11-29 RX ADMIN — FENTANYL CITRATE 50 MCG: 50 INJECTION, SOLUTION INTRAMUSCULAR; INTRAVENOUS at 14:53

## 2022-11-29 ASSESSMENT — ACTIVITIES OF DAILY LIVING (ADL)
ADLS_ACUITY_SCORE: 35

## 2022-11-29 ASSESSMENT — LIFESTYLE VARIABLES: TOBACCO_USE: 0

## 2022-11-29 ASSESSMENT — COPD QUESTIONNAIRES: COPD: 0

## 2022-11-29 NOTE — CONSULTS
No charge consult. Patient is already admitted to hospitalist service. Please complete order. Encourage ortho team to have special attention to pre-selected orders. Page hospitalist team as needed for questions.    Cesar Ferreira MD

## 2022-11-29 NOTE — PLAN OF CARE
Goal Outcome Evaluation:      Plan of Care Reviewed With: patient    Overall Patient Progress: improvingOverall Patient Progress: improving        Observation goals  PRIOR TO DISCHARGE          Comments:     -Diagnostic tests and consults completed and resulted -Not met    -Vital signs normal or at patient baseline -Met    -Infection is improving -Met    -Pending ortho rec's -Yes    Nurse to notify provider when observation goals have been met and patient is ready for discharge.        Summary: Patient is here with rt long finger cellulitis, I&D attempted in ED   Care Plan Summary Note:  Orientation: A&OX4  Observation Goals (met & not met): Not met  Activity Level: Ind  Fall Risk: No  Behavior & Aggression Tool Color: Yellow, patient was at the hallway yellling at staff at change of shift b/c his ointment was not yet delivered by pharmacy, threatening to leave and using vulgar words, was later calm once he got the ointment  Pain Management: PRN Tylenol given x1 this shift  ABNL VS/O2: VSS on RA  ABNL Lab/BG: WBC 12.6  Diet: NPO except meds and ice chips  Bowel/Bladder: Continent B&B  Drains/Devices: PIV is s/l, gets intermittent I.V antibiotics  Tests/Procedures for next shift: I/D /CC consults  Anticipated DC date: TBD  Other Important Info: Pt has tendency to be verbally aggressive

## 2022-11-29 NOTE — CONSULTS
Consult Date: 2022    ORTHOPEDIC CONSULTATION    CHIEF COMPLAINT:  Right middle finger infection.    HISTORY OF PRESENT ILLNESS:  The patient is a 42-year-old male who awoke with pain and swelling of his long finger 4 days ago.  He presented to the emergency room where attempted drainage was done.  He was then admitted for antibiotics, but has had ongoing pain, swelling and drainage.  His pain is not improved.  Dr. Farmer has requested Orthopedic evaluation.  He describes no other complaints or problems.  He has been on vancomycin and Zosyn.  His past medical history is noncontributory.  HE HAS NO KNOWN ALLERGIES.  He is on an inhaler preoperatively.  For the remaining details of his history, please see the written medical record.    PHYSICAL EXAMINATION:  He has negative Kanavel signs.  He has substantial swelling with expressible purulence at the lateral aspect of his finger.  There is no clear joint swelling.  There is overlying poor skin quality and redness.  The nail is unremarkable.    Review of his laboratory studies shows an elevated white blood count yesterday.  This has diminished today.    X-rays are negative.    IMPRESSION:  Infected right middle finger.    RECOMMENDATION:  He has an abscess of the finger.  I recommend irrigation, debridement, which will be performed today.  Risks and benefits were reviewed.  The patient wishes discharge today.  I reviewed that this is possible, but would generally await antibiotic specification and ensure that he responds clinically.  We will contact Infectious Disease to do empiric treatment.  Questions were answered.      John Vasquez MD        D: 2022   T: 2022   MT: PAKMT    Name:     NI GARCIA  MRN:      [id number]        Account:      [id number]   :      1980           Consult Date: 2022     Document: [id number]

## 2022-11-29 NOTE — PHARMACY-VANCOMYCIN DOSING SERVICE
"Pharmacy Vancomycin Initial Note  Date of Service 2022  Patient's  1980  42 year old, male    Indication: Abscess and Skin and Soft Tissue Infection    Current estimated CrCl = Estimated Creatinine Clearance: 122.1 mL/min (based on SCr of 0.92 mg/dL).    Creatinine for last 3 days  2022:  1:30 PM Creatinine 0.92 mg/dL    Recent Vancomycin Level(s) for last 3 days  No results found for requested labs within last 72 hours.      Vancomycin IV Administrations (past 72 hours)                   vancomycin (VANCOCIN) 2,000 mg in 0.9% NaCl 500 mL intermittent infusion (mg) 2,000 mg New Bag 22 1451                Nephrotoxins and other renal medications (From now, onward)    Start     Dose/Rate Route Frequency Ordered Stop    22  piperacillin-tazobactam (ZOSYN) 3.375 g vial to attach to  mL bag        Note to Pharmacy: For [facility], [facility] and [facility]: For Zosyn-naive patients, use the \"Zosyn initial dose + extended infusion\" order panel.    3.375 g  over 30 Minutes Intravenous EVERY 6 HOURS 22 1829      22 0000  amoxicillin-clavulanate (AUGMENTIN) 875-125 MG tablet         1 tablet Oral 2 TIMES DAILY 22 1246 22 2359          Contrast Orders - past 72 hours (72h ago, onward)    None          [employer] Prediction of Planned Initial Vancomycin Regimen  Loading dose: 2000 mg x1  Regimen: 1000 mg IV every 12 hours.  Start time: 03:00 on 2022  Exposure target: AUC24 (range)400-600 mg/L.hr   AUC24,ss: 440 mg/L.hr  Probability of AUC24 > 400: 60 %  Ctrough,ss: 13.3 mg/L  Probability of Ctrough,ss > 20: 21 %  Probability of nephrotoxicity (Lodise CLIVE ): 8 %          Plan:  1. Received vancomycin 2000 mg x1, continue vancomycin 1000 mg IV q12h.   2. Vancomycin monitoring method: AUC  3. Vancomycin therapeutic monitoring goal: 400-600 mg*h/L  4. Pharmacy will check vancomycin levels as appropriate in 1-3 Days.    5. Serum creatinine levels will be ordered daily " for the first week of therapy and at least twice weekly for subsequent weeks.      Puneet Avila [facility]

## 2022-11-29 NOTE — PROGRESS NOTES
MD Notification    Notified Person: MD    Notified Person Name: Darshana Rosas MD    Notification Date/Time: 11/29/22 @ 1639hrs.     Notification Interaction: Cecilia paging    Purpose of Notification: Pt would like to discharge tonight. He is reporting that he doesn't have Health insurance and that his Cousin is on the way to pick him.     Orders Received:    Comments:

## 2022-11-29 NOTE — ANESTHESIA POSTPROCEDURE EVALUATION
Patient: Jerald Padron    Procedure: Procedure(s):  IRRIGATION AND DEBRIDEMENT, right middle finger       Anesthesia Type:  General    Note:  Disposition: Outpatient   Postop Pain Control: Uneventful            Sign Out: Well controlled pain   PONV: No   Neuro/Psych: Uneventful            Sign Out: Acceptable/Baseline neuro status   Airway/Respiratory: Uneventful            Sign Out: Acceptable/Baseline resp. status   CV/Hemodynamics: Uneventful            Sign Out: Acceptable CV status; No obvious hypovolemia; No obvious fluid overload   Other NRE: NONE   DID A NON-ROUTINE EVENT OCCUR?            Last vitals:  Vitals Value Taken Time   /88 11/29/22 1550   Temp     Pulse 68 11/29/22 1554   Resp 10 11/29/22 1554   SpO2 93 % 11/29/22 1554   Vitals shown include unvalidated device data.    Electronically Signed By: Ale Patrick  November 29, 2022  3:55 PM

## 2022-11-29 NOTE — PROGRESS NOTES
Orthopedic Surgery  Jerald Padron  11/29/2022   Admit Date:  11/28/2022    Right middle finger infection, probable felon infection    Patient resting comfortably in bed.    He notes slightly worsened pain from the PIP joint to the tip of his right middle finger compared to yesterday.  Redness has not expanded outside of marked line.  Currently NPO, but previously tolerating oral intake.  Denies nausea or vomiting.  Denies chest pain or shortness of breath.  Afebrile.  On vancomycin and Zosyn.  No events overnight.     Vital Sign Ranges  Temp:  [97  F (36.1  C)-98.6  F (37  C)] 97  F (36.1  C)  Pulse:  [67-85] 72  Resp:  [16-18] 16  BP: (104-149)/(60-89) 104/60  SpO2:  [94 %-98 %] 97 %    Physical exam:  NAD. Nontoxic appearing. Alert and oriented to person, place, and time.  Right middle finger:   Circumferential erythema from the middle phalanx and distally.   No streaking of the volar hand and forearm.  Moderate swelling of the right middle finger.  Skin intact without active discharge, but pustular type lesion forming over the ulnar DIP joint region.  Exquisitely tender at the PIP joint and distally.  Nontender over the remainder of the hand, wrist, and forearm.  Decreased flexion and extension of the DIP and PIP joints due to pain and swelling.  Radial pulse is 2+.  Fingers are warm and well-perfused.  Sensation intact.    Labs:  Recent Labs   Lab Test 11/28/22  1330      POTASSIUM 3.8   CHLORIDE 103   BUN 17   CR 0.92     Recent Labs   Lab Test 11/28/22  1330   WBC 12.6*   HGB 16.6        CRP (11/28/22): 6.1    Repeat CBC and CRP ordered today.     A/P  1. Right middle finger cellulitis, concern for felon infection   Will plan for OR today for I&D of the right middle finger.   Remain NPO.   Continue with vancomycin and Zosyn per hospitalist team.   Continue with pain regimen as previously instructed.   Continue with Hibliclens soaks until surgery.   Dressings: None currently.   Follow-up:  Pending postoperative instructions and findings.     2. Disposition   Anticipate d/c to home once medically cleared and transitions to PO antibiotics postoperatively.    Maryjane Rushing PA-C

## 2022-11-29 NOTE — ANESTHESIA PROCEDURE NOTES
Airway       Patient location during procedure: OR       Procedure Start/Stop Times: 11/29/2022 2:13 PM  Staff -        CRNA: Jose Armando Dewitt APRN CRNA       Performed By: CRNAIndications and Patient Condition       Indications for airway management: javier-procedural       Induction type:intravenous       Mask difficulty assessment: 2 - vent by mask + OA or adjuvant +/- NMBA    Final Airway Details       Final airway type: endotracheal airway       Successful airway: ETT - single  Endotracheal Airway Details        ETT size (mm): 8.0       Cuffed: yes       Successful intubation technique: direct laryngoscopy       DL Blade Type: Simpson 2       Grade View of Cords: 1       Adjucts: stylet       Measured from: lips       Secured at (cm): 25       Bite block used: None    Post intubation assessment        Placement verified by: capnometry, equal breath sounds and chest rise        Number of attempts at approach: 1       Secured with: pink tape       Ease of procedure: easy       Dentition: Intact and Unchanged    Medication(s) Administered   Medication Administration Time: 11/29/2022 2:13 PM       No

## 2022-11-29 NOTE — PROGRESS NOTES
Observation goals  PRIOR TO DISCHARGE       Comments:   -diagnostic tests and consults completed and resulted; Not met     -vital signs normal or at patient baseline; Met     -infection is improving : Not met     -pending ortho rec's: Partially met     Nurse to notify provider when observation goals have been met and patient is ready for discharge.

## 2022-11-29 NOTE — PLAN OF CARE
Goal Outcome Evaluation:      Plan of Care Reviewed With: patient    Overall Patient Progress: improvingOverall Patient Progress: improving        Observation goals  PRIOR TO DISCHARGE          Comments:     -Diagnostic tests and consults completed and resulted -Not met    -Vital signs normal or at patient baseline -Met    -Infection is improving -Met    -Pending ortho rec's -Yes    Nurse to notify provider when observation goals have been met and patient is ready for discharge.

## 2022-11-29 NOTE — UTILIZATION REVIEW
Concurrent stay review; Secondary Review Determination    Under the authority of the Utilization Management Committee, the utilization review process indicated a secondary review on the above patient. The review outcome is based on review of the medical records, discussions with staff, and applying clinical experience noted on the date of the review.    (x) Observation Status Appropriate - Concurrent stay review        RATIONALE FOR DETERMINATION: 42-year-old male awoke with right middle finger swelling, erythema and pain.  Patient first had some erythema and swelling 4 days earlier but worsened without relief with attempts to self drain the area.  With worsening pain patient presents to hospital and found to have a right long distal finger infection, CRP 6.1 white blood count 12.6 thousand with plans for incision and drainage on hospital day 2.  Patient remains afebrile with normal white blood count and successful I&D in hospital day 2, observation care appropriate awaiting discharge planning.    Patient is clinically improving and there is no clear indication to change patient's status to inpatient. The severity of illness, intensity of service provided, expected LOS and risk for adverse outcome make the care appropriate for observation.    This document was produced using voice recognition software    The information on this document is developed by the utilization review team in order for the business office to ensure compliance. This only denotes the appropriateness of proper admission status and does not reflect the quality of care rendered.    The definitions of Inpatient Status and Observation Status used in making the determination above are those provided in the CMS Coverage Manual, Chapter 1 and Chapter 6, section 70.4.    Sincerely,    Osvaldo Ramirez MD  Utilization Review  Physician Advisor  [facility].

## 2022-11-29 NOTE — CONSULTS
[facility]    Infectious Disease Consultation     Date of Admission:  11/28/2022  Date of Consult (When I saw the patient): 11/29/22    Assessment & Plan   Jerald Padron is a 42 year old male who was admitted on 11/28/2022.     Impression:  1. 42 y.o presented to the ED with right long finger pain.   2.The patient was in his usual state of health until when he woke up on Thursday morning (11/24) with right middle finger swelling, erythema and pain.   3. He notes there was a small pustule on the side of his finger.   He tried to treat this himself by soaking his hand in warm water and also did poke at it with a needle to try to drain it.    4. the pain and redness progressively became worse over the last few days therefore presented to the ED.    5. Plan is for I and D  6. Currently on zosyn/ vanco     Recommendations:   1. Keep the antibiotics broad for now   2. I and D planned for today will follow up on OR cultures and report   3. Will continue to follow.       Janae Fitzpatrick MD    Reason for Consult   Reason for consult: I was asked to evaluate this patient for right long finger infection .    Primary Care Physician   Physician No Ref-Primary    Chief Complaint   Right long finger infection     History is obtained from the patient and medical records    History of Present Illness   Jerald Padron is a 42 year with right long finger.   Started 11/ 24 with a small pustule. He did try to drain it using a needle.   The swelling went worse to presented to the hospital     Past Medical History   I have reviewed this patient's medical history and updated it with pertinent information if needed.   No past medical history on file.    Past Surgical History   I have reviewed this patient's surgical history and updated it with pertinent information if needed.  No past surgical history on file.    Prior to Admission Medications   Prior to Admission Medications   Prescriptions Last Dose  Informant Patient Reported? Taking?   albuterol (PROAIR HFA/PROVENTIL HFA/VENTOLIN HFA) 108 (90 Base) MCG/ACT inhaler prn med  Yes Yes   Sig: Inhale 2 puffs into the lungs every 6 hours as needed for shortness of breath / dyspnea or wheezing      Facility-Administered Medications: None     Allergies   No Known Allergies    Immunization History     There is no immunization history on file for this patient.    Social History   I have reviewed this patient's social history and updated it with pertinent information if needed. Jerald Padron      Family History   I have reviewed this patient's family history and updated it with pertinent information if needed.   No family history on file.    Review of Systems   The 10 point Review of Systems is negative other than pain and redness in the right long finger     Physical Exam   Temp: 97.2  F (36.2  C) Temp src: Oral BP: 128/86 Pulse: 62   Resp: 16 SpO2: 97 % O2 Device: None (Room air)    Vital Signs with Ranges  Temp:  [97  F (36.1  C)-98.6  F (37  C)] 97.2  F (36.2  C)  Pulse:  [62-85] 62  Resp:  [16-18] 16  BP: (104-149)/(60-89) 128/86  SpO2:  [94 %-98 %] 97 %  180 lbs 3.2 oz  Body mass index is 25.13 kg/m .    GENERAL APPEARANCE:  awake  EYES: Eyes grossly normal to inspection  NECK: no adenopathy  RESP: lungs clear   CV: regular rates and rhythm  LYMPHATICS: normal ant/post cervical and supraclavicular nodes  ABDOMEN: soft, nontender  MS: right long finger is swollen, deformed, with a nodule on the lateral side erythema   SKIN: no suspicious lesions or rashes        Data   Lab Results   Component Value Date    WBC 10.6 11/29/2022    HGB 15.2 11/29/2022    HCT 46.0 11/29/2022     11/29/2022     11/28/2022    POTASSIUM 3.8 11/28/2022    CHLORIDE 103 11/28/2022    CO2 27 11/28/2022    BUN 17 11/28/2022    CR 1.17 11/29/2022     (H) 11/28/2022     No results for input(s): CULT in the last 168 hours.  No lab results found.    Invalid input(s):  UC       All cultures:  Recent Labs   Lab 11/28/22  1330   CULTURE No growth after 12 hours  No growth after 12 hours      Blood culture:  Results for orders placed or performed during the hospital encounter of 11/28/22   Blood Culture Peripheral Blood    Specimen: Peripheral Blood   Result Value Ref Range    Culture No growth after 12 hours    Blood Culture Peripheral Blood    Specimen: Peripheral Blood   Result Value Ref Range    Culture No growth after 12 hours       Urine culture:  No results found for this or any previous visit.

## 2022-11-29 NOTE — ANESTHESIA PREPROCEDURE EVALUATION
Anesthesia Pre-Procedure Evaluation    Patient: Jerald Padron   MRN: [id number] : 1980        Procedure : Procedure(s):  IRRIGATION AND DEBRIDEMENT, right middle finger          Past Medical History:   Diagnosis Date     Asthma      Deviated nasal septum      Gastroesophageal reflux disease with esophagitis       No past surgical history on file.   No Known Allergies   Social History     Tobacco Use     Smoking status: Not on file     Smokeless tobacco: Not on file   Substance Use Topics     Alcohol use: Not on file      Wt Readings from Last 1 Encounters:   22 81.7 kg (180 lb 3.2 oz)        Anesthesia Evaluation   Pt has had prior anesthetic. Type: General.    No history of anesthetic complications       ROS/MED HX  ENT/Pulmonary:     (+) asthma  (-) tobacco use, COPD and sleep apnea   Neurologic:  - neg neurologic ROS     Cardiovascular:    (-) hypertension and CAD   METS/Exercise Tolerance:     Hematologic:  - neg hematologic  ROS     Musculoskeletal:       GI/Hepatic: Comment: Occasional GERD    (+) GERD,  (-) liver disease   Renal/Genitourinary:    (-) renal disease   Endo:    (-) Type I DM and Type II DM   Psychiatric/Substance Use:       Infectious Disease:       Malignancy:       Other:            Physical Exam    Airway        Mallampati: II   TM distance: > 3 FB   Neck ROM: full   Mouth opening: > 3 cm    Respiratory Devices and Support         Dental  no notable dental history         Cardiovascular   cardiovascular exam normal          Pulmonary   pulmonary exam normal                OUTSIDE LABS:  CBC:   Lab Results   Component Value Date    WBC 10.6 2022    WBC 12.6 (H) 2022    HGB 15.2 2022    HGB 16.6 2022    HCT 46.0 2022    HCT 49.0 2022     2022     2022     BMP:   Lab Results   Component Value Date     2022    POTASSIUM 3.8 2022    CHLORIDE 103 2022    CO2 27 2022    BUN 17 2022     CR 1.17 11/29/2022    CR 0.92 11/28/2022     (H) 11/28/2022     COAGS: No results found for: PTT, INR, FIBR  POC: No results found for: BGM, HCG, HCGS  HEPATIC: No results found for: ALBUMIN, PROTTOTAL, ALT, AST, GGT, ALKPHOS, BILITOTAL, BILIDIRECT, KORTNEY  OTHER:   Lab Results   Component Value Date    DEV 9.2 11/28/2022    CRP 7.5 11/29/2022       Anesthesia Plan    ASA Status:  2   NPO Status:  NPO Appropriate    Anesthesia Type: General.     - Airway: ETT   Induction: Intravenous.   Maintenance: Balanced.        Consents    Anesthesia Plan(s) and associated risks, benefits, and realistic alternatives discussed. Questions answered and patient/representative(s) expressed understanding.    - Discussed:     - Discussed with:  Patient      - Extended Intubation/Ventilatory Support Discussed: No.      - Patient is DNR/DNI Status: No    Use of blood products discussed: No .     Postoperative Care    Pain management: IV analgesics.     - Plan for long acting post-op opioid use   PONV prophylaxis: Ondansetron (or other 5HT-3), Dexamethasone or Solumedrol     Comments:                Andrew Ramirez MD

## 2022-11-29 NOTE — PROGRESS NOTES
[facility]    Hospitalist Progress Note    Date of Service (when I saw the patient): 11/29/2022  Admit date: 11/28/2022    Interval History   Full details of events over last 24 hours outlined below.   Continues to have a lot of pain in the right finger.  He states of antibiotic soaks are helping.  He feels like it started to relieve some of that pressure.  He states he was quite upset last night because he was unable to get bacitracin it took forever.  Denies any SOB, CP, abdominal pain, N/V/D.    Assessment & Plan   Mr. Jerald Padron is a 42-year-old male patient who is relatively healthy and presents with right middle finger swelling, erythema and pain and found with signs of infection/abscess.     On initial evaluation, vital signs show temperature 97.6, heart rate 77, blood pressure 149/89, respiration 18, O2 saturation 98% on room air.  Laboratory evaluation showed CBC with white count 12.6, otherwise normal. BMP normal.  He had x-rays of the right hand, which showed no signs of fracture or osteomyelitis.      Right middle finger cellulitis with concern for underlying abscess.  Initial presentation as above.  I and D was attempted in the ER, but much was able to be drained.      Continues on piperacillin-tazobactam and vancomycin.    Appreciate ID consult: Recommend continuing current antibiotics as we follow cultures from the OR    Appreciate orthopedic Surgery consult.  Going to OR today.    Added hydrocodone with as needed IV Dilaudid.  Discussed wise use of narcotic pain meds around acute injury and surgery.  Patient expressed agreement to use only for severe pain around this acute episode and to taper off within the next 3 days     Asthma.  Continue p.r.n. albuterol.     Clinically Significant Risk Factors Present on Admission         # Hyponatremia: Lowest Na = 134 mmol/L (Ref range: 136-145) in last 2 days, will monitor as appropriate              # Overweight: Estimated  "body mass index is 25.13 kg/m  as calculated from the following:    Height as of this encounter: 1.803 m (5' 11\").    Weight as of this encounter: 81.7 kg (180 lb 3.2 oz).             Diet: Orders Placed This Encounter      NPO per Anesthesia Guidelines for Procedure/Surgery Except for: Meds     IVF: LR at 100 ml/h, stop when eating  Mims Catheter: Not present     DVT Prophylaxis: PCD's and ambulate  Code Status: Full Code     Disposition: Expected discharge once we have cultures and can determine outpatient regimen of antibiotics.  We will switch to inpatient.   Communication: Discussed with patient on 11/29/22    Darshana Rosas MD  Hospitalist Service  [facility]  Securely message with the Vocera Web Console (learn more here)  Text page via [employer] Paging/Directory    -Data reviewed today: I reviewed all new labs and imaging results over the last 24 hours. I personally reviewed no images or EKG's today.    Physical Exam   Temp: 97.7  F (36.5  C) Temp src: Oral BP: 123/77 Pulse: 74   Resp: 16 SpO2: 95 % O2 Device: None (Room air)    Vitals:    11/28/22 1408   Weight: 81.7 kg (180 lb 3.2 oz)     Vital Signs with Ranges  Temp:  [97  F (36.1  C)-98.6  F (37  C)] 97.7  F (36.5  C)  Pulse:  [62-85] 74  Resp:  [16-18] 16  BP: (104-135)/(60-86) 123/77  SpO2:  [94 %-97 %] 95 %  No intake/output data recorded.    Today's Exam  Constitutional: NAD,   Neuropsyche: alert and oriented, answers questions appropriately.   Respiratory: Breathing comfortably, good air exchange, no wheezes, no crackles.   Cardiovascular: Regular rate and rhythm, no edema.  GI: soft, NT/ND, BS normal  Skin/Integumen: Third digit of right hand is swollen with protuberance suggestive of underlying asthma on the lateral side and associated erythema.  Wise no acute rash or signs of bleeding    Medications   All medications reviewed on 11/29/22       lactated ringers       Patient RECEIVING antibiotic to treat a different " condition and it provides ADEQUATE COVERAGE for this surgical procedure.         [Auto Hold] piperacillin-tazobactam  3.375 g Intravenous Q6H     [Auto Hold] vancomycin  1,000 mg Intravenous Q12H     PRN Meds: [Auto Hold] acetaminophen **OR** [Auto Hold] acetaminophen, [Auto Hold] albuterol, [Auto Hold] bacitracin, [Auto Hold] chlorhexidine, [Auto Hold] HYDROcodone-acetaminophen, [Auto Hold] melatonin, [Auto Hold] naloxone **OR** [Auto Hold] naloxone **OR** [Auto Hold] naloxone **OR** [Auto Hold] naloxone, [Auto Hold] ondansetron **OR** [Auto Hold] ondansetron, [Auto Hold] polyethylene glycol, [Auto Hold] prochlorperazine **OR** [Auto Hold] prochlorperazine **OR** [Auto Hold] prochlorperazine, [Auto Hold] senna-docusate **OR** [Auto Hold] senna-docusate    Data   Recent Labs   Lab 11/29/22  0900 11/28/22  1330   WBC 10.6 12.6*   HGB 15.2 16.6   MCV 89 87    284   NA  --  134   POTASSIUM  --  3.8   CHLORIDE  --  103   CO2  --  27   BUN  --  17   CR 1.17 0.92   ANIONGAP  --  4   DEV  --  9.2   GLC  --  117*       No results found for this or any previous visit (from the past 24 hour(s)).

## 2022-11-29 NOTE — PROGRESS NOTES
"Orientation/Cognitive: A/OX4.   Observation Goals (Met/ Not Met): Not met  Mobility Level/Assist Equipment: Ind  Fall Risk (Y/N): Y  Behavior Concerns: Frustrated.  Pain Management: PRN Tylenol.   Tele/VS/O2: VSS on RA.   ABNL Lab/BG: WBC 12.6  Diet: Reg. NPO at midnight.   Bowel/Bladder: Continent   Skin Concerns: R middle finger wound noted.   Drains/Devices: PIV SL.   Tests/Procedures for next shift: None  Anticipated DC date & active delays: TBD  Patient Stated Goal for Today: Sleep. \"I haven't slept for two days now\"  Pt arrived from ED past dinner time and wanted to order meals, but the kitchen was already closed. Angry and agitated, saying that he read reviews of this hospital online and the reviews were not good. Pt then wanted Bacitracin ointment right away, however the medication has to come from Main pharmacy. Pt then started yelling saying that he wants to leave this hospital. Writer acknowledged  pt's frustrations, called Pharm tech and the medication was tubed to the floor.Bactricin was then applied to pt's wound and pt went to sleep.   "

## 2022-11-29 NOTE — OP NOTE
Procedure Date: 11/28/2022    PREOPERATIVE DIAGNOSIS:  Infected right middle finger with abscess.    POSTOPERATIVE DIAGNOSIS:  Infected right middle finger with abscess.    PROCEDURE PERFORMED:    1.  Irrigation and debridement, right middle finger abscess.  2.  Right middle finger DIP joint arthrotomy, exploration and irrigation and debridement.    SURGEON:  John Vasquez MD    ASSISTANT:  Margaux Lanier PA-C    ANESTHESIA:  General.    TOURNIQUET TIME:  Approximately 20 minutes with a finger tourniquet.    SPECIMENS:  Cultures sent.    COMPLICATIONS:  None.    DESCRIPTION OF PROCEDURE:  The patient was taken to the operating room where after administration of ongoing antibiotic treatment, general anesthetic, sterile prep and drape, and finger tourniquet application, an elliptical incision was made around the 5 mm area of necrotic open wound with dead tissue.  Scant pus was present and a culture was sent.  I excised necrotic skin, subcutaneous fat and dead tissue.  A rongeur and knife were used.  Copious irrigation was then performed.  There was no fluid collection or pocket.  This was on the ulnar aspect of the digit distally at the level of the nail matrix, but on the side of the matrix.  The nail bed did not appear to be involved.  I then explored the subcutaneous tissue and found no evidence of pus.  There was no clinical evidence of flexor sheath involvement.      Due to proximity and as a precaution, I used clean instruments to open the DIP joint and further irrigated this with antibiotic solution.  There was no evidence of infection in this location.  The wounds were irrigated and a simple nylon stitch placed with a wick into the open portion of the wound distally.  The overall wound size was 15 x 5 mm.  There were no complications.    John Vasquez MD        D: 11/29/2022   T: 11/29/2022   MT: ETREMT    Name:     NI GARCIA  MRN:      [id number]        Account:        [id number]    :      1980           Procedure Date: 2022     Document: [id number]

## 2022-11-29 NOTE — ANESTHESIA CARE TRANSFER NOTE
Patient: Jerald Padron    Procedure: Procedure(s):  IRRIGATION AND DEBRIDEMENT, right middle finger       Diagnosis: Abscess of right middle finger [L02.511]  Cellulitis of right middle finger [L03.011]  Diagnosis Additional Information: No value filed.    Anesthesia Type:   General     Note:    Oropharynx: oropharynx clear of all foreign objects  Level of Consciousness: awake  Oxygen Supplementation: face mask  Level of Supplemental Oxygen (L/min / FiO2): 6  Independent Airway: airway patency satisfactory and stable  Dentition: dentition unchanged  Vital Signs Stable: post-procedure vital signs reviewed and stable  Report to RN Given: handoff report given  Patient transferred to: PACU    Handoff Report: Identifed the Patient, Identified the Reponsible Provider, Reviewed the pertinent medical history, Discussed the surgical course, Reviewed Intra-OP anesthesia mangement and issues during anesthesia, Set expectations for post-procedure period and Allowed opportunity for questions and acknowledgement of understanding      Vitals:  Vitals Value Taken Time   /96 11/29/22 1447   Temp     Pulse 79 11/29/22 1449   Resp 10 11/29/22 1449   SpO2 99 % 11/29/22 1449   Vitals shown include unvalidated device data.    Electronically Signed By: DUANE Guerrero CRNA  November 29, 2022  2:50 PM

## 2022-11-30 NOTE — PLAN OF CARE
"Pt is in hurry discharging from the hospital. Writer tried to go through AVS as much as the pt would allow. Pt would say\" I know all that\" as writer was explaining through AVS. Pt determined to discharge regardless as the pt doesn't have Health insurance.  Discharging home at this time w/ meds. Pt's Cousin will transport.       Plan of Care Reviewed With: patient                 "

## 2022-11-30 NOTE — DISCHARGE SUMMARY
[facility]  Hospitalist Discharge Summary      Date of Admission:  11/28/2022  Date of Discharge:  11/29/2022  Discharging Provider: Darshana Rosas MD  Discharge Service: Hospitalist Service    Discharge Diagnoses   Right middle finger cellulitis with concern for underlying abscess.      S/p I&D of R middle finger on 11/29/22  Asthma      Follow-ups Needed After Discharge   Follow-up Appointments     Follow-up and recommended labs and tests       Follow up with Dr. Vasquez/Dianne PAC in 1 week for wound check and suture   removal.  No follow up labs or test are needed prior to visit.  Call [facility] for appointment or questions [phone]  After hours: [phone]             Unresulted Labs Ordered in the Past 30 Days of this Admission     Date and Time Order Name Status Description    11/29/2022  2:29 PM Swab Aerobic Bacterial Culture Routine In process     11/29/2022  2:29 PM Gram Stain In process     11/29/2022  2:29 PM Anaerobic Bacterial Culture Routine In process     11/28/2022  1:15 PM Blood Culture Peripheral Blood Preliminary     11/28/2022  1:15 PM Blood Culture Peripheral Blood Preliminary       These results should be followed up by Ortho, who he will be seeing in follow up.   Hospital service will be notified if blood cultures turn positive.     Discharge Disposition   Discharged to home  Condition at discharge: Good      Hospital Course   Mr. Jerald Padron is a 42-year-old male patient who is relatively healthy and presents with right middle finger swelling, erythema and pain and found with signs of infection/abscess.     On initial evaluation, vital signs show temperature 97.6, heart rate 77, blood pressure 149/89, respiration 18, O2 saturation 98% on room air.  Laboratory evaluation showed CBC with white count 12.6, otherwise normal. BMP normal.  He had x-rays of the right hand, which showed no signs of fracture or osteomyelitis.      Right middle finger cellulitis with  concern for underlying abscess.  Initial presentation as above.  I and D was attempted in the ER, but much was able to be drained.    S/p I&D of R middle finger on 11/29/22    Continues on piperacillin-tazobactam and vancomycin.    Appreciate ID consult: Recommend continuing current antibiotics as we follow cultures from the OR    Appreciate orthopedic Surgery consult.  Underwent irrigation and debridement, right middle finger abscess, as well as right middle finger DIP joint arthrotomy, exploration and irrigation and debridement on 11/29/22 by Dr. John Vasquez.     Pain meds, antibiotics and follow up ordered by surgery at discharge.      Asthma.  Continue p.r.n. albuterol.       Consultations This Hospital Stay   PHARMACY TO DOSE VANCO  CARE MANAGEMENT / SOCIAL WORK IP CONSULT  ORTHOPEDIC SURGERY IP CONSULT  INFECTIOUS DISEASES IP CONSULT  PHARMACY TO DOSE VANCO  HOSPITALIST IP CONSULT  OCCUPATIONAL THERAPY ADULT IP CONSULT  PHYSICAL THERAPY ADULT IP CONSULT    Code Status   Full Code    Time Spent on this Encounter   I, Darshana Rosas MD, personally saw the patient today and spent greater than 30 minutes discharging this patient.       Darshana Rosas MD  [facility] EXTENDED RECOVERY AND SHORT STAY  [address] [place] [id number]  Phone: [phone]  ______________________________________________________________________    Physical Exam   Vital Signs: Temp: 97.4  F (36.3  C) Temp src: Oral BP: 131/86 Pulse: 77   Resp: 16 SpO2: 97 % O2 Device: None (Room air) Oxygen Delivery: 6 LPM  Weight: 180 lbs 3.2 oz  See note from earlier today.   I also saw him a prior to discharge.  His wound was fully dressed.  He is up and walking around.       Primary Care Physician   Physician No Ref-Primary    Discharge Orders      Reason for your hospital stay    Right middle finger infection, s/p I&D     Follow-up and recommended labs and tests     Follow up with Dr. Vasquez/Dianne PAC in 1 week for  wound check and suture removal.  No follow up labs or test are needed prior to visit.  Call [facility] for appointment or questions [phone]  After hours: [phone]     Activity    Your activity upon discharge: No weight bearing more than 1-2 pounds in the right hand, avoid use of the middle finger     Wound care and dressings    Instructions to care for your wound at home: Leave dressing in place until follow up appointment. Keep clean, dry, and intact. Contact [facility] at [phone] if dressing becomes saturated or falls off.     When to contact your care team    Call your primary doctor if you have any of the following: temperature greater than 100.4 F, increased drainage, increased swelling, increased redness, or increased pain.       Significant Results and Procedures   Most Recent 3 CBC's:Recent Labs   Lab Test 11/29/22  0900 11/28/22  1330   WBC 10.6 12.6*   HGB 15.2 16.6   MCV 89 87    284     Most Recent 3 BMP's:Recent Labs   Lab Test 11/29/22  0900 11/28/22  1330   NA  --  134   POTASSIUM  --  3.8   CHLORIDE  --  103   CO2  --  27   BUN  --  17   CR 1.17 0.92   ANIONGAP  --  4   DEV  --  9.2   GLC  --  117*   ,   Results for orders placed or performed during the hospital encounter of 11/28/22   XR Finger Right G/E 2 Views    Narrative    XR FINGER RIGHT G/E 2 VIEWS 11/28/2022 1:26 PM    HISTORY: swelling, finger infection    COMPARISON: None.      Impression    IMPRESSION: No fracture. No degenerative changes. No evidence of  osteomyelitis.    ADRIAN DEAN MD         SYSTEM ID:  [id number]       Discharge Medications   Discharge Medication List as of 11/29/2022  6:19 PM      START taking these medications    Details   acetaminophen (TYLENOL) 325 MG tablet Take 2 tablets (650 mg) by mouth every 6 hours as needed for mild pain or other (and adjunct with moderate or severe pain or per patient request), Disp-112 tablet, R-0, E-Prescribe      ondansetron (ZOFRAN ODT) 4 MG ODT tab Take 1 tablet (4  mg) by mouth every 6 hours as needed for nausea or vomiting, Disp-5 tablet, R-0, E-Prescribe      oxyCODONE (ROXICODONE) 5 MG tablet Take 1 tablet (5 mg) by mouth every 6 hours as needed for severe pain (7-10), Disp-10 tablet, R-0, E-Prescribe      sulfamethoxazole-trimethoprim (BACTRIM DS) 800-160 MG tablet Take 1 tablet by mouth 2 times daily for 7 days, Disp-14 tablet, R-0, E-Prescribe         CONTINUE these medications which have NOT CHANGED    Details   albuterol (PROAIR HFA/PROVENTIL HFA/VENTOLIN HFA) 108 (90 Base) MCG/ACT inhaler Inhale 2 puffs into the lungs every 6 hours as needed for shortness of breath / dyspnea or wheezing, Historical           Allergies   No Known Allergies   81.6

## 2022-12-01 LAB — BACTERIA SPEC CULT: ABNORMAL

## 2022-12-03 LAB
BACTERIA BLD CULT: NO GROWTH
BACTERIA BLD CULT: NO GROWTH

## 2022-12-06 LAB — BACTERIA SPEC CULT: NORMAL

## 2023-01-06 ENCOUNTER — HOSPITAL ENCOUNTER (EMERGENCY)
Facility: CLINIC | Age: 43
Discharge: HOME | End: 2023-01-06
Attending: EMERGENCY MEDICINE | Admitting: EMERGENCY MEDICINE

## 2023-01-06 VITALS
DIASTOLIC BLOOD PRESSURE: 76 MMHG | HEIGHT: 71 IN | OXYGEN SATURATION: 97 % | HEART RATE: 84 BPM | BODY MASS INDEX: 25.9 KG/M2 | RESPIRATION RATE: 16 BRPM | WEIGHT: 185 LBS | SYSTOLIC BLOOD PRESSURE: 124 MMHG | TEMPERATURE: 98.7 F

## 2023-01-06 DIAGNOSIS — L03.113 RIGHT FOREARM CELLULITIS: ICD-10-CM

## 2023-01-06 DIAGNOSIS — L73.9 STAPHYLOCOCCUS AUREUS SUPERFICIAL FOLLICULITIS: ICD-10-CM

## 2023-01-06 DIAGNOSIS — L02.32 BOIL OF BUTTOCK: ICD-10-CM

## 2023-01-06 DIAGNOSIS — B95.61 STAPHYLOCOCCUS AUREUS SUPERFICIAL FOLLICULITIS: ICD-10-CM

## 2023-01-06 PROCEDURE — 99284 EMERGENCY DEPT VISIT MOD MDM: CPT

## 2023-01-06 RX ORDER — CEPHALEXIN 500 MG/1
500 CAPSULE ORAL 4 TIMES DAILY
Qty: 40 CAPSULE | Refills: 0 | Status: SHIPPED | OUTPATIENT
Start: 2023-01-06 | End: 2023-01-16

## 2023-01-06 RX ORDER — SULFAMETHOXAZOLE/TRIMETHOPRIM 800-160 MG
1 TABLET ORAL 2 TIMES DAILY
Qty: 20 TABLET | Refills: 0 | Status: SHIPPED | OUTPATIENT
Start: 2023-01-06 | End: 2023-01-16

## 2023-01-06 ASSESSMENT — ENCOUNTER SYMPTOMS: COLOR CHANGE: 1

## 2023-01-06 ASSESSMENT — ACTIVITIES OF DAILY LIVING (ADL): ADLS_ACUITY_SCORE: 35

## 2023-01-06 NOTE — ED TRIAGE NOTES
Lump and reddened area on right forearm and also similar sore on left side of buttocks. Started on Tuesday. No fevers. Staph infection in right middle finger a little over a month ago. A&Ox4     Triage Assessment     Row Name 01/06/23 1547       Triage Assessment (Adult)    Airway WDL WDL       Respiratory WDL    Respiratory WDL WDL       Skin Circulation/Temperature WDL    Skin Circulation/Temperature WDL WDL       Cardiac WDL    Cardiac WDL WDL       Peripheral/Neurovascular WDL    Peripheral Neurovascular WDL WDL       Cognitive/Neuro/Behavioral WDL    Cognitive/Neuro/Behavioral WDL WDL

## 2023-01-06 NOTE — ED PROVIDER NOTES
"    History     Chief Complaint:  Wound Check       HPI   Jerald Padron is a 42 year old male who presents with concern for a staph infection. He says that he cut himself on a cardboard box in last November and was diagnosed with a staph infection of his finger at that time. He had it drained and had relief of his symptoms, however he presents today due to a red bump on his right medial forearm and another on his Left gluteal area. He says that the bump on his rear end is most irritating because it hurts when he sits or shifts in his seat. He also notes that he is a runner and that it is causing a lot of chaffing for him. He says that today he took and epsom salt bath. He received Bactrim in the beginning of December which he says seemed to help him.      Independent Historian: The patient.      Review of External Notes: Reviewed prior hospitalization where he had I&D of his right middle finger.  Culture grew MRSA.  He was discharged on Bactrim.    ROS:  Review of Systems   Skin: Positive for color change and rash.   All other systems reviewed and are negative.    Allergies:  No Known Allergies     Medications:    Albuterol  Zofran  Bactrim    Past Medical History:    Asthma  Deviated nasal septum  GERD    Past Surgical History:    I & D finger     Social History:   reports that he has quit smoking. His smoking use included cigarettes. He does not have any smokeless tobacco history on file. He reports that he does not currently use alcohol. He reports current drug use. Drug: Marijuana.  Patient is a runner.     Physical Exam     Patient Vitals for the past 24 hrs:   BP Temp Temp src Pulse Resp SpO2 Height Weight   01/06/23 1548 124/76 98.7  F (37.1  C) Temporal 84 16 97 % -- --   01/06/23 1547 -- -- -- -- -- -- 1.803 m (5' 11\") 83.9 kg (185 lb)      Physical Exam  Head:  The scalp, face, and head appear normal  Eyes:  Conjunctivae are normal  ENT:    The nose is normal    Pinnae are normal  Neck:  Trachea " midline  CV:  Normal rate  Resp:  No respiratory distress   Musc:  Normal muscular tone  Skin:  Furuncle noted to right volar forearm. No fluctuance with about 3cm of surrounding erythema radially.  Furuncle also noted to left gluteal fold.  No fluctuance.  Several surrounding areas consistent with folliculitis.  Also scabbed over areas of prior draining furuncles.  Neuro: Speech is normal and fluent. Face is symmetric. Moving all extremities well.   Psych:  Awake. Alert.  Normal affect.  Appropriate interactions.        Emergency Department Course     Emergency Department Course & Assessments:     Disposition:  The patient was discharged to home.     Impression & Plan      Medical Decision Making:  Patient presents with 2 areas on the right forearm and gluteal fold concerning for staph infection.  Patient has history of recent finger infection that did grow MRSA.  He has evidence of folliculitis as well.  Patient likely colonized with staph with resulting infections.  Neither furuncle appears to be fluctuant.  I do not believe patient would benefit from I&D at this time.  He was encouraged to use hot compresses, and we will start him on Bactrim and Keflex for empiric coverage of both MRSA as well as strep infections.  He appears to have a mild cellulitis of the right forearm.  We discussed chlorhexidine scrubs to try and decolonize himself of staph, which he states he has already been trying.  He does not have primary care, therefore I placed a primary care referral to ensure close follow-up early next week.  Recommended ED return for worsening redness/pain/swelling as I&D may be indicated at that time.  He is discharged in stable condition.  All questions answered.    Diagnosis:    ICD-10-CM    1. Staphylococcus aureus superficial folliculitis  L73.9 Primary Care Referral    B95.61       2. Right forearm cellulitis  L03.113 Primary Care Referral      3. Boil of buttock  L02.32 Primary Care Referral            Discharge Medications:  New Prescriptions    CEPHALEXIN (KEFLEX) 500 MG CAPSULE    Take 1 capsule (500 mg) by mouth 4 times daily for 10 days    SULFAMETHOXAZOLE-TRIMETHOPRIM (BACTRIM DS) 800-160 MG TABLET    Take 1 tablet by mouth 2 times daily for 10 days        Scribe Disclosure:  I, Usman Torres, am serving as a scribe at 4:02 PM on 1/6/2023 to document services personally performed by Yaw Diane MD based on my observations and the provider's statements to me.          Yaw Diane MD  01/07/23 0206

## 2023-02-03 ENCOUNTER — HOSPITAL ENCOUNTER (EMERGENCY)
Facility: CLINIC | Age: 43
Discharge: HOME | End: 2023-02-03
Attending: EMERGENCY MEDICINE | Admitting: EMERGENCY MEDICINE

## 2023-02-03 VITALS
RESPIRATION RATE: 16 BRPM | HEIGHT: 71 IN | WEIGHT: 175 LBS | OXYGEN SATURATION: 98 % | DIASTOLIC BLOOD PRESSURE: 88 MMHG | TEMPERATURE: 98.1 F | BODY MASS INDEX: 24.5 KG/M2 | SYSTOLIC BLOOD PRESSURE: 136 MMHG | HEART RATE: 67 BPM

## 2023-02-03 DIAGNOSIS — L02.219 CELLULITIS AND ABSCESS OF TRUNK: ICD-10-CM

## 2023-02-03 DIAGNOSIS — L03.319 CELLULITIS AND ABSCESS OF TRUNK: ICD-10-CM

## 2023-02-03 PROCEDURE — 99283 EMERGENCY DEPT VISIT LOW MDM: CPT

## 2023-02-03 RX ORDER — SULFAMETHOXAZOLE/TRIMETHOPRIM 800-160 MG
1 TABLET ORAL 2 TIMES DAILY
Qty: 20 TABLET | Refills: 0 | Status: SHIPPED | OUTPATIENT
Start: 2023-02-03 | End: 2023-02-13

## 2023-02-03 ASSESSMENT — ACTIVITIES OF DAILY LIVING (ADL): ADLS_ACUITY_SCORE: 35

## 2023-02-03 ASSESSMENT — ENCOUNTER SYMPTOMS
WEAKNESS: 0
NUMBNESS: 0

## 2023-02-04 NOTE — ED PROVIDER NOTES
"  History     Chief Complaint:  Wound Check       The history is provided by the patient.      Jerald Padron is a 42 year old male with a history of asthma who presents with wound check. Patient reports wound started to get bigger and tender today. Patient reports pain around the infection on his back. Patient denies bowel incontinence, numbness and weakness in arms and legs.  No bowel or bladder incontinence.  He denies history of drug use or being diabetic.     Independent Historian:   None - Patient Only    Review of External Notes: Chart marked for merge with alternate MRN.  The ultimate chart shows and admission to the hospital from 11/28/22-11/29/22 for abscess of the right middle finger requiring surgical debridement and arthrotomy.  History of MRSA.  Primary care no-show on 1/23/2023.    ROS:  Review of Systems   Neurological: Negative for weakness and numbness.   All other systems reviewed and are negative.      Allergies:  No Known Allergies     Medications:    Albuterol    Past Medical History:    Asthma    Social History:  The patient presents alone.  Arrived by private vehicle.       PCP: No Ref-Primary, Physician     Physical Exam     Patient Vitals for the past 24 hrs:   BP Temp Temp src Pulse Resp SpO2 Height Weight   02/03/23 2118 -- -- -- -- 16 -- -- --   02/03/23 2053 136/88 -- -- 67 16 98 % -- --   02/03/23 1927 (!) 142/80 98.1  F (36.7  C) Temporal 66 16 99 % 1.803 m (5' 11\") 79.4 kg (175 lb)        Physical Exam  General: Standing in the ED treatment room, no distress  HEENT: Normocephalic, atraumatic  Cardiac: Warm and well perfused  Pulm: Breathing comfortably, no accessory muscle usage, no conversational dyspnea  Skin: Warm and dry.  1.5 cm oval area of induration lateral to the lumbar spine with 1 to 2 cm of surrounding erythema and central scab consistent with small abscess/cellulitis.  Neuro: Moves all extremities  Psych: Normal mood and affect    Emergency Department Course "         Emergency Department Course & Assessments:         Social Determinants of Health affecting care:   None    Assessments:    I obtained history and examined the patient as noted above.   At this point I feel that the patient is safe for discharge, and the patient agrees.    Disposition:  The patient was discharged to home.     Impression & Plan      Medical Decision Makin-year-old healthy male presents with concern for skin infection as above.  His exam is consistent with a cutaneous abscess lateral to the lumbar spine that already has signs of drainage with a central scab.  There is no tenderness in the midline, no history of IV drug use, low suspicion for deep tissue infection such as epidural abscess at this time.  The patient furthermore has no symptoms of cauda equina.  He declines incision and drainage at this time which I believe is reasonable due to the evidence of ongoing drainage described above.  The plan is for discharge home with prescription for Bactrim, primary care referral, and instructions for hot compresses.  The patient expresses understanding of and agreement with this plan.    Critical Care time:  was 0 minutes for this patient excluding procedures.    Diagnosis:    ICD-10-CM    1. Cellulitis and abscess of trunk  L03.319 Primary Care Referral    L02.219            Discharge Medications:  New Prescriptions    SULFAMETHOXAZOLE-TRIMETHOPRIM (BACTRIM DS) 800-160 MG TABLET    Take 1 tablet by mouth 2 times daily for 10 days        Scribe Disclosure:  IKRISTIE PRINCESS, am serving as a scribe at 8:50 PM on 2/3/2023 to document services personally performed by Kelechi James MD based on my observations and the provider's statements to me.    2/3/2023   Kelechi James MD King, Colin, MD  23       Kelechi James MD  23

## 2023-02-04 NOTE — ED TRIAGE NOTES
Recurrent skin infections required antibiotics in the past. This time right lower back. Started 2 days ago     Triage Assessment     Row Name 02/03/23 1928       Respiratory WDL    Respiratory WDL WDL       Skin Circulation/Temperature WDL    Skin Circulation/Temperature WDL X  reddened pustule lower back       Cardiac WDL    Cardiac WDL WDL       Peripheral/Neurovascular WDL    Peripheral Neurovascular WDL WDL       Cognitive/Neuro/Behavioral WDL    Cognitive/Neuro/Behavioral WDL WDL

## 2023-03-20 ENCOUNTER — HOSPITAL ENCOUNTER (EMERGENCY)
Facility: CLINIC | Age: 43
Discharge: HOME | End: 2023-03-21
Attending: PHYSICIAN ASSISTANT | Admitting: PHYSICIAN ASSISTANT

## 2023-03-20 VITALS
HEART RATE: 90 BPM | SYSTOLIC BLOOD PRESSURE: 125 MMHG | DIASTOLIC BLOOD PRESSURE: 60 MMHG | RESPIRATION RATE: 18 BRPM | OXYGEN SATURATION: 99 % | TEMPERATURE: 98 F

## 2023-03-20 DIAGNOSIS — L02.419 CELLULITIS AND ABSCESS OF LEG: ICD-10-CM

## 2023-03-20 DIAGNOSIS — L03.119 CELLULITIS AND ABSCESS OF LEG: ICD-10-CM

## 2023-03-20 PROCEDURE — 99283 EMERGENCY DEPT VISIT LOW MDM: CPT

## 2023-03-20 RX ORDER — SULFAMETHOXAZOLE/TRIMETHOPRIM 800-160 MG
1 TABLET ORAL 2 TIMES DAILY
Qty: 14 TABLET | Refills: 0 | Status: SHIPPED | OUTPATIENT
Start: 2023-03-20 | End: 2023-03-27

## 2023-03-20 RX ORDER — SULFAMETHOXAZOLE/TRIMETHOPRIM 800-160 MG
1 TABLET ORAL ONCE
Status: COMPLETED | OUTPATIENT
Start: 2023-03-21 | End: 2023-03-21

## 2023-03-21 PROCEDURE — 250N000013 HC RX MED GY IP 250 OP 250 PS 637: Performed by: PHYSICIAN ASSISTANT

## 2023-03-21 RX ADMIN — SULFAMETHOXAZOLE AND TRIMETHOPRIM 1 TABLET: 800; 160 TABLET ORAL at 00:08

## 2023-03-21 ASSESSMENT — ENCOUNTER SYMPTOMS
WOUND: 1
FEVER: 0
CHILLS: 1

## 2023-03-21 NOTE — ED TRIAGE NOTES
Pt has wound on right buttock similar to previous wounds from his MRSA and he was started on antibiotics for it in the past.      Triage Assessment     Row Name 03/20/23 0927       Triage Assessment (Adult)    Airway WDL WDL       Respiratory WDL    Respiratory WDL WDL       Skin Circulation/Temperature WDL    Skin Circulation/Temperature WDL X       Cardiac WDL    Cardiac WDL WDL       Peripheral/Neurovascular WDL    Peripheral Neurovascular WDL WDL       Cognitive/Neuro/Behavioral WDL    Cognitive/Neuro/Behavioral WDL WDL

## 2023-03-21 NOTE — ED PROVIDER NOTES
History     Chief Complaint:  Wound Check       The history is provided by the patient.      Jerald Padron is a 42 year old male with a history of MRSA who presents with a wound on his right lower buttock that developed two days ago. He reports that he squeezed his wound causing him to develop pain in his buttock. He also reports chills. The patient has a history of MRSA and has required antibiotics for it in the past.      Independent Historian: No independent historian      Review of External Notes:     ROS:  Review of Systems   Constitutional: Positive for chills. Negative for fever.   Skin: Positive for wound (right buttock).   All other systems reviewed and are negative.      Allergies:  The patient has no active allergies.      Medications:    Albuterol     Past Medical History:    Asthma   GERD   MRSA    Past Surgical History:    I&D    Social History:  The patient presents to the ED alone.   He arrived via private vehicle.     Physical Exam     Patient Vitals for the past 24 hrs:   BP Temp Temp src Pulse Resp SpO2   03/20/23 2155 125/60 98  F (36.7  C) Oral 90 18 99 %        Physical Exam  Vitals and nursing note reviewed.   Constitutional:       Appearance: He is not diaphoretic.   Eyes:      Conjunctiva/sclera: Conjunctivae normal.   Pulmonary:      Effort: Pulmonary effort is normal.   Skin:     General: Skin is warm.      Findings: Abscess and erythema present.             Comments: Erythema and induration of the right buttock.  No significant fluctuance or crepitus.  Tender on palpation.  About half dollar in size.  No pustule head.  No purulent drainage.  No lymphatic streaking.   Neurological:      Mental Status: He is alert and oriented to person, place, and time. Mental status is at baseline.   Psychiatric:         Mood and Affect: Mood normal.         Behavior: Behavior normal.         Thought Content: Thought content normal.           Emergency Department Course     Emergency Department  Course & Assessments:       Interventions:  Medications   sulfamethoxazole-trimethoprim (BACTRIM DS) 800-160 MG per tablet 1 tablet (has no administration in time range)        Independent Interpretation (X-rays, CTs, rhythm strip):      Assessments/Consultations/Discussion of Management or Tests:   ED Course as of 03/20/23 2351   Mon Mar 20, 2023   2350 I obtained history and examined the patient as noted above. The patient states that he does not want to drain is abscess but would like to receive an antibiotic, Bactrim, which has worked for him in the past. We discussed plan for discharge and the patient is comfortable with this plan.       Social Determinants of Health affecting care:         Disposition:  The patient was discharged to home.     Impression & Plan    CMS Diagnoses: None    Medical Decision Making:  This is 42-year-old male who presents concerned regarding right buttock infection.  He has had these in the past.  He does not feel like he requires any incision and drainage today and does not want to have this done.  I do recommend trial of incision and drainage however the patient again declines this today.  He prefers just treatment with antibiotics specifically Bactrim as this is resolved his symptoms in the past.  Patient's vital signs are within normal limits is not consistent with sepsis.  I do not think this is necrotizing fasciitis or has any underlying necrosis.  Patient will be placed on Bactrim and the patient understands that by not performing incision and drainage that he risks having it get worse.  If it does worsen and become more enlarged she should return back to the emergency department for incision and drainage.  He should also return if he develops high fevers nausea vomiting or worsening condition.    My impression of today's diagnosis is     ICD-10-CM    1. Cellulitis and abscess of leg  L03.119     L02.419          Discharge Medications:  New Prescriptions     SULFAMETHOXAZOLE-TRIMETHOPRIM (BACTRIM DS) 800-160 MG TABLET    Take 1 tablet by mouth 2 times daily for 7 days        Scribe Disclosure:  I, Aan Ramirez, am serving as a scribe at 12:00 AM on 3/21/2023 to document services personally performed by Kenneth Dumas PA-C based on my observations and the provider's statements to me.    3/20/2023   Kenneth Dumas PA-C Kruger, Jacob C, PA-C  03/21/23 8051

## 2023-06-13 ENCOUNTER — HOSPITAL ENCOUNTER (EMERGENCY)
Facility: CLINIC | Age: 43
Discharge: HOME | End: 2023-06-13
Attending: EMERGENCY MEDICINE | Admitting: EMERGENCY MEDICINE

## 2023-06-13 VITALS
BODY MASS INDEX: 26.46 KG/M2 | HEART RATE: 80 BPM | SYSTOLIC BLOOD PRESSURE: 132 MMHG | OXYGEN SATURATION: 99 % | TEMPERATURE: 98.1 F | HEIGHT: 71 IN | WEIGHT: 189 LBS | DIASTOLIC BLOOD PRESSURE: 69 MMHG | RESPIRATION RATE: 16 BRPM

## 2023-06-13 DIAGNOSIS — L02.92 BOIL: ICD-10-CM

## 2023-06-13 PROCEDURE — 99284 EMERGENCY DEPT VISIT MOD MDM: CPT

## 2023-06-13 RX ORDER — CLINDAMYCIN PHOSPHATE 10 MG/G
GEL TOPICAL 2 TIMES DAILY
Qty: 75 ML | Refills: 0 | Status: SHIPPED | OUTPATIENT
Start: 2023-06-13

## 2023-06-13 RX ORDER — SULFAMETHOXAZOLE/TRIMETHOPRIM 800-160 MG
1 TABLET ORAL 2 TIMES DAILY
Qty: 20 TABLET | Refills: 0 | Status: SHIPPED | OUTPATIENT
Start: 2023-06-13 | End: 2023-06-23

## 2023-06-13 ASSESSMENT — ACTIVITIES OF DAILY LIVING (ADL): ADLS_ACUITY_SCORE: 33

## 2023-06-14 NOTE — DISCHARGE INSTRUCTIONS
We are recommending topical clinda gel and bactrim. Return with rapid increase in redness to consider drainage or incision

## 2023-06-14 NOTE — ED PROVIDER NOTES
"  History     Chief Complaint:  Wound Check       HPI   Jerald TAMIKA Padron is a 43 year old male who presents for a wound check.  Patient is an otherwise healthy 43-year-old male who presents with a red painful rash on his upper gluteus.  Patient has had prior boils and abscesses.  Most of which been treated with oral antibiotics.  Patient's been given Bactrim with success.  Patient developed another boil on his backside and reports the emergency room with concerns.  Patient does have a documented history of MRSA.      Independent Historian:   None - Patient Only    Review of External Notes:        Medications:    The patient is not currently taking any prescribed medications    Past Medical History:    Asthma  Deviated nasal septum  Gastroesophageal reflux disease with esophagitis    Past Surgical History:    Irrigation and debridement finger, combined, right     Physical Exam     Patient Vitals for the past 24 hrs:   BP Temp Temp src Pulse Resp SpO2 Height Weight   06/13/23 2145 (!) 147/98 98.1  F (36.7  C) Oral 82 16 100 % 1.803 m (5' 11\") 85.7 kg (189 lb)      Physical Exam  Vitals reviewed.   HENT:      Head: Normocephalic.   Cardiovascular:      Rate and Rhythm: Normal rate.   Pulmonary:      Effort: Pulmonary effort is normal.   Skin:     Comments: About 2 cm above the gluteal cleft.  There is a small dime sized area of erythema with a small area of fluctuance consistent with a boil.  No active drainage.  Mild tenderness with palpation.   Neurological:      Mental Status: He is alert.           Emergency Department Course     Emergency Department Course & Assessments:     Assessments:  2325 I obtained history and examined the patient as noted above. At this point I feel that the patient is safe for discharge, and the patient agrees.     Independent Interpretation (X-rays, CTs, rhythm strip):  None    Consultations/Discussion of Management or Tests:  None        Social Determinants of Health affecting care: "   None    Disposition:  The patient was discharged to home.     Impression & Plan      Medical Decision Making:  Patient presents with a small boil on his buttock.  Suspect patient will respond to antibiotics as he has in the past offered topical clindamycin gel and oral Bactrim as has been given in the past and was discharged home in stable condition.    Diagnosis:    ICD-10-CM    1. Boil  L02.92            Discharge Medications:  New Prescriptions    CLINDAMYCIN (CLINDAMAX) 1 % EXTERNAL GEL    Apply topically 2 times daily    SULFAMETHOXAZOLE-TRIMETHOPRIM (BACTRIM DS) 800-160 MG TABLET    Take 1 tablet by mouth 2 times daily for 10 days      Scribe Disclosure:  MOE BENTON, am serving as a scribe at 11:31 PM on 6/13/2023 to document services personally performed by Curtis Blake MD based on my observations and the provider's statements to me.     6/13/2023   Curtis Blake MD Goodman, Brian Samuel, MD  06/14/23 9938

## 2023-06-14 NOTE — ED TRIAGE NOTES
Patient presents with abscess on right, upper buttock.  He reports hx of multiple staff infections over the past 8 months which needed draining and antibiotics.  He denies fevers.     Triage Assessment     Row Name 06/13/23 8766       Triage Assessment (Adult)    Airway WDL WDL       Respiratory WDL    Respiratory WDL WDL       Skin Circulation/Temperature WDL    Skin Circulation/Temperature WDL WDL       Cardiac WDL    Cardiac WDL WDL       Peripheral/Neurovascular WDL    Peripheral Neurovascular WDL WDL       Cognitive/Neuro/Behavioral WDL    Cognitive/Neuro/Behavioral WDL WDL

## 2023-07-27 ENCOUNTER — HOSPITAL ENCOUNTER (EMERGENCY)
Facility: CLINIC | Age: 43
Discharge: HOME | End: 2023-07-27
Attending: EMERGENCY MEDICINE | Admitting: EMERGENCY MEDICINE

## 2023-07-27 VITALS
DIASTOLIC BLOOD PRESSURE: 79 MMHG | TEMPERATURE: 98.1 F | BODY MASS INDEX: 25.2 KG/M2 | WEIGHT: 180 LBS | OXYGEN SATURATION: 97 % | HEART RATE: 86 BPM | HEIGHT: 71 IN | SYSTOLIC BLOOD PRESSURE: 137 MMHG | RESPIRATION RATE: 16 BRPM

## 2023-07-27 DIAGNOSIS — L02.91 ABSCESS: ICD-10-CM

## 2023-07-27 DIAGNOSIS — L03.317 CELLULITIS OF BUTTOCK: ICD-10-CM

## 2023-07-27 PROCEDURE — 76857 US EXAM PELVIC LIMITED: CPT

## 2023-07-27 PROCEDURE — 10060 I&D ABSCESS SIMPLE/SINGLE: CPT

## 2023-07-27 PROCEDURE — 99284 EMERGENCY DEPT VISIT MOD MDM: CPT | Mod: 25

## 2023-07-27 RX ORDER — CEPHALEXIN 500 MG/1
500 CAPSULE ORAL 4 TIMES DAILY
Qty: 40 CAPSULE | Refills: 0 | Status: SHIPPED | OUTPATIENT
Start: 2023-07-27 | End: 2023-08-06

## 2023-07-27 RX ORDER — SULFAMETHOXAZOLE/TRIMETHOPRIM 800-160 MG
1 TABLET ORAL 2 TIMES DAILY
Qty: 14 TABLET | Refills: 0 | Status: SHIPPED | OUTPATIENT
Start: 2023-07-27 | End: 2023-08-03

## 2023-07-27 ASSESSMENT — ACTIVITIES OF DAILY LIVING (ADL)
ADLS_ACUITY_SCORE: 33
ADLS_ACUITY_SCORE: 33

## 2023-07-27 NOTE — ED PROVIDER NOTES
"  History     Chief Complaint:  Wound Infection     The history is provided by the patient.   Jerald Padron is a 43 year old male with history of MRSA who presents to the ED for evaluation of a boil to his buttock. The patient was evaluated at [facility] ED on 6/13/23 for boil to his buttock and started on Bactrim and Clindamax ointment. Today he reports over the last three days he has noticed an area of redness to his lower back/buttock with small amount of drainage. He has used topical antibiotic ointment and states he has had similar symptoms with six different staph infections. He denies fevers, chills, or other concerns.     Independent Historian:   None - Patient Only    Review of External Notes:       Medications:    Albuterol   Zofran     Past Medical History:    Asthma   GERD   Deviated Septum  MRSA     Past Surgical History:    I&D of right finger    Physical Exam   Patient Vitals for the past 24 hrs:   BP Temp Temp src Pulse Resp SpO2 Height Weight   07/27/23 0804 137/79 98.1  F (36.7  C) Oral 86 16 97 % 1.803 m (5' 11\") 81.6 kg (180 lb)      Physical Exam  Constitutional: Well appearing.  HEENT: Atraumatic.  Moist mucous membranes.  Cardiac: Extremities are warm well perfused.  Respiratory: Clear to auscultation bilaterally.  No respiratory distress.    Musculoskeletal: No edema.  Normal range of motion.  Neurologic: Alert and oriented.  Normal tone and bulk. Normal gait.  Skin: There is an area of erythema, induration, and slight fluctuance on the left upper buttock at the gluteal cleft.  There is an area that may have been previously draining.  Psych: Normal affect.  Normal behavior.        Emergency Department Course      Procedure: Incision and Drainage   Consent: Verbal    Indication: Abscess    Location:  Left upper buttock near the gluteal cleft    Size: 2 cm    Ultrasound Guidance: No    Preparation: None    Anesthesia/Sedation: Lidocaine - 1%    Procedure Detail:    Aspiration: " No  Incision Type: Single straight  Scalpel: 11  Lesion Management: Probed and deloculated   Wound Management: Left open   Packing: None , too small for packing    Patient Status: The patient tolerated the procedure well: Yes. There were no complications.    Emergency Department Course & Assessments:     Assessments:  0920 Initial Examination  1020  Recheck, I&D performed    Independent Interpretation (X-rays, CTs, rhythm strip):  None    Consultations/Discussion of Management or Tests:  None      Social Determinants of Health affecting care:   None    Disposition:  The patient was discharged to home.     Impression & Plan    CMS Diagnoses: None    Medical Decision Making:  Jerald Padron is a 43 year old male who is afebrile and hemodynamically stable.  He has what appears to be an early abscess in his gluteal cleft.  There is some evidence that it may have spontaneous drained previously.  Informal bedside ultrasound did reveal a very small fluid collection we discussed incision and drainage and he is in agreement.  This is performed as above.  Get a small amount of purulent drainage and probed the abscess pocket with no further purulence expressed.  There is too small to pack.  I will place him on Bactrim and Keflex.  He has no systemic signs of infection.  We discussed strict return precautions including any worsening of the pain or redness or systemic signs infection he is in agreement.  Discussed supportive care at home and strict return precautions were given.  I stressed the need for follow-up with a primary care physician for further evaluation of why he developed multiple abscesses and infections related to the skin.  He is in agreement his questions were answered.  He was in no distress at time of discharge.    Diagnosis:    ICD-10-CM    1. Abscess  L02.91       2. Cellulitis of buttock  L03.317          Discharge Medications:  New Prescriptions    CEPHALEXIN (KEFLEX) 500 MG CAPSULE    Take 1 capsule  (500 mg) by mouth 4 times daily for 10 days    SULFAMETHOXAZOLE-TRIMETHOPRIM (BACTRIM DS) 800-160 MG TABLET    Take 1 tablet by mouth 2 times daily for 7 days      Scribe Disclosure:  I, David Foote, am serving as a scribe at 9:10 AM on 7/27/2023 to document services personally performed by Marshall Ahn MD based on my observations and the provider's statements to me.   7/27/2023   Marshall Ahn MD Salay, Nicholas J, MD  07/27/23 1544

## 2023-07-27 NOTE — ED TRIAGE NOTES
Patient presents with concern for staph infection on wound on lower back/buttocks. States he has been treated for this 6 different times. Denies fevers. First noticed it growing in size and becoming more painful the last few days. Has been using clindamycin cream but not improving.

## 2023-08-23 ENCOUNTER — HOSPITAL ENCOUNTER (EMERGENCY)
Facility: CLINIC | Age: 43
Discharge: HOME | End: 2023-08-23
Attending: PHYSICIAN ASSISTANT | Admitting: PHYSICIAN ASSISTANT

## 2023-08-23 VITALS
SYSTOLIC BLOOD PRESSURE: 128 MMHG | OXYGEN SATURATION: 99 % | TEMPERATURE: 97 F | DIASTOLIC BLOOD PRESSURE: 91 MMHG | HEIGHT: 71 IN | WEIGHT: 185 LBS | HEART RATE: 81 BPM | BODY MASS INDEX: 25.9 KG/M2 | RESPIRATION RATE: 16 BRPM

## 2023-08-23 DIAGNOSIS — L02.91 ABSCESS: ICD-10-CM

## 2023-08-23 PROCEDURE — 99283 EMERGENCY DEPT VISIT LOW MDM: CPT

## 2023-08-23 RX ORDER — SULFAMETHOXAZOLE/TRIMETHOPRIM 800-160 MG
1 TABLET ORAL 2 TIMES DAILY
Qty: 14 TABLET | Refills: 0 | Status: SHIPPED | OUTPATIENT
Start: 2023-08-23 | End: 2023-08-30

## 2023-08-24 NOTE — ED TRIAGE NOTES
Triage Assessment       Row Name 08/23/23 2054       Triage Assessment (Adult)    Airway WDL WDL       Respiratory WDL    Respiratory WDL WDL       Skin Circulation/Temperature WDL    Skin Circulation/Temperature WDL WDL       Cardiac WDL    Cardiac WDL WDL       Peripheral/Neurovascular WDL    Peripheral Neurovascular WDL WDL       Cognitive/Neuro/Behavioral WDL    Cognitive/Neuro/Behavioral WDL WDL                Pt. Has pimple on centerline LB for months and goes away and returns. MRSA HX.

## 2023-08-24 NOTE — ED PROVIDER NOTES
"  History     Chief Complaint:  Wound Check (Pt. Has pimple on centerline LB for months and goes away and returns. MRSA HX.)       PRERNA Padron is a 43 year old male who presents for evaluation of a wound. The patient reports that he has a painful pimple on his centerline lower back for the last few months that goes away and returns. He noticed his pimple returning yesterday. The patient has a known history of MRSA. He denies fever and chills. The patient refused I&D during his stay at the ED.      Independent Historian:   None - Patient Only    Review of External Notes:   None       Medications:    Zofran  Proair    Past Medical History:    GERD  Asthma  Deviated nasal septum  MRSA infection    Past Surgical History:    Irrigation & debridement finger (R)    Physical Exam   Patient Vitals for the past 24 hrs:   BP Temp Temp src Pulse Resp SpO2 Height Weight   08/23/23 2053 (!) 128/91 97  F (36.1  C) Temporal 81 16 99 % 1.803 m (5' 11\") 83.9 kg (185 lb)        Physical Exam  Constitutional: Pleasant. Cooperative.  Eyes: Pupils equally round  HENT: Head is normal in appearance. Oropharynx is normal with moist mucus membranes.  Cardiovascular: Regular rate and rhythm without murmurs.  Respiratory: Normal respiratory effort, lungs clear to auscultation  Musculoskeletal: No asymmetry  Skin: Small pustule noted overlying midline lower L spine. No underlying induration, fluctuance. Non-tender. No surrounding erythema.  Neurologic: Cranial nerves grossly intact, normal cognition, no apparent deficits.  Psychiatric: Normal affect.  Nursing notes and vital signs reviewed.      Emergency Department Course     Emergency Department Course & Assessments:    Interventions:  Medications - No data to display     Assessments:  2113 I obtained history and examined the patient as noted above    Independent Interpretation (X-rays, CTs, rhythm strip):  None    Consultations/Discussion of Management or Tests:  None    "     Social Determinants of Health affecting care:   None    Disposition:  The patient was discharged to home.     Impression & Plan      Medical Decision Making:  Jerald Padron is a 43 year old male who presents to the ED for evaluation of a skin concern. Patient has history of MRSA. He notes history of similar lesions in the past. See HPI as above for additional details. Vitals and physical exam as above. Patient refuses any I&D, noting preference for only abx treatment currently. Abx prescribed as below. Discussed reasons to return. All questions answered. Patient discharged to home in stable condition.    Diagnosis:    ICD-10-CM    1. Abscess  L02.91            Discharge Medications:  New Prescriptions    SULFAMETHOXAZOLE-TRIMETHOPRIM (BACTRIM DS) 800-160 MG TABLET    Take 1 tablet by mouth 2 times daily for 7 days          Scribe Disclosure:  Jon BENTON, am serving as a scribe at 9:18 PM on 8/23/2023 to document services personally performed by Ace Louis PA-C based on my observations and the provider's statements to me.   8/23/2023   Ace Louis PA-C     This record was created at least in part using electronic voice recognition software, so please excuse any typographical errors.       Ace Louis PA-C  08/23/23 2122

## 2023-08-30 ENCOUNTER — HOSPITAL ENCOUNTER (EMERGENCY)
Facility: CLINIC | Age: 43
Discharge: HOME | End: 2023-08-30
Attending: EMERGENCY MEDICINE | Admitting: EMERGENCY MEDICINE

## 2023-08-30 VITALS
HEART RATE: 84 BPM | DIASTOLIC BLOOD PRESSURE: 146 MMHG | OXYGEN SATURATION: 99 % | SYSTOLIC BLOOD PRESSURE: 164 MMHG | TEMPERATURE: 98 F | RESPIRATION RATE: 16 BRPM

## 2023-08-30 DIAGNOSIS — R45.1 AGITATION: ICD-10-CM

## 2023-08-30 DIAGNOSIS — R46.89 AGGRESSIVE BEHAVIOR: ICD-10-CM

## 2023-08-30 DIAGNOSIS — L02.212 ABSCESS OF BACK: ICD-10-CM

## 2023-08-30 PROCEDURE — 99285 EMERGENCY DEPT VISIT HI MDM: CPT

## 2023-08-30 ASSESSMENT — ACTIVITIES OF DAILY LIVING (ADL): ADLS_ACUITY_SCORE: 35

## 2023-08-30 NOTE — ED TRIAGE NOTES
Pt wants a medication refill of sulfameth/trimethoprim 800/160mg     Pt came in frustrated and yelling in triage

## 2023-08-30 NOTE — ED PROVIDER NOTES
History     Chief Complaint:  Medication Refill       HPI   Jerald Padron is a 43 year old male who was seen here in August 23 for an abscess in his low back.  He refused I&D at that time stating that he wants antibiotics and was given a weeks worth of Bactrim.  Not surprisingly he presents today with continued abscess and pain.  He is on the antibiotics he is requesting a refill.  In triage she was swearing and yelling immediately upon entering the room saying that he was not treated appropriately last time and he needs a longer course of antibiotics.  Security was called and he was placed in a behavioral health room.  Upon my arrival he is swearing at me immediately saying we short change him on his antibiotics.  He did allow me to look at his back and he does have an abscess which is not actively draining but certainly requires I&D.  There is no surrounding erythema or warmth.  Again he is very agitated and adamant that he does not want anything done other than antibiotics.      Independent Historian:    The patient    Review of External Notes:  I reviewed his note from 1 week ago.    Medications:    albuterol (PROAIR HFA/PROVENTIL HFA/VENTOLIN HFA) 108 (90 Base) MCG/ACT inhaler  clindamycin (CLINDAMAX) 1 % external gel  ondansetron (ZOFRAN ODT) 4 MG ODT tab  sulfamethoxazole-trimethoprim (BACTRIM DS) 800-160 MG tablet        Past Medical History:    Past Medical History:   Diagnosis Date    Asthma     Deviated nasal septum     Gastroesophageal reflux disease with esophagitis        Past Surgical History:    Past Surgical History:   Procedure Laterality Date    IRRIGATION AND DEBRIDEMENT FINGER, COMBINED Right 11/29/2022    Procedure: IRRIGATION AND DEBRIDEMENT, right middle finger;  Surgeon: John Vasquez MD;  Location:  OR          Physical Exam   Patient Vitals for the past 24 hrs:   BP Temp Temp src Pulse Resp SpO2   08/30/23 1050 (!) 164/146 98  F (36.7  C) Oral 84 16 99 %     "    Physical Exam  Constitutional: Vital signs reviewed.  Agitated, swearing profusely.  HEENT: Moist mucous membranes  Cardiovascular: Regular rate and rhythm  Pulmonary/Chest: Breathing comfortably on room air.  No audible wheezing  Musculoskeletal/Extremities: No bony deformities.  Moves all 4 extremities without difficulty.  Neurological: Alert.  No focal deficits.  Endo: No pitting edema  Skin: Area of fluctuance consistent with abscess in the mid low back.  No surrounding erythema or warmth.  No active drainage.  Psychiatric: Agitated, unpleasant      Emergency Department Course       Emergency Department Course & Assessments:       Disposition:  The patient was discharged to home.     Impression & Plan        Medical Decision Making:  The patient presents to the emergency department upset that when he was seen here 1 week ago he only got 1 weeks worth of antibiotics.  He refused I&D for his abscess at that time.  I tried to explain to him that the treatment for this is indeed I&D and he is again refusing stating \"you don't know shit.\"  He continues swearing over and over again that he just wants his \"fucking antibiotics.\"  I informed him that he did receive the correct dose and duration for his antibiotic but for abscesses to completely heal they need incision and drainage.  He says there is no way he is going to have this done.  He said he had it done once for a different 1 and things got worse.  I again informed him that this was a standard of care and he disagreed with that as well.  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  I respectfully disagreed and told him that the actual standard of care is open these.  He continued to escalate to the point where we had asked him to leave and security needed to escort him out of the building.  I do not feel any further antibiotics be beneficial for his treatment and the only way this is going to get better as if it " drains.      Diagnosis:    ICD-10-CM    1. Abscess of back  L02.212            Discharge Medications:  New Prescriptions    No medications on file          Kodi Turner MD  8/30/2023   Kodi Turner MD Walters, Brent Aaron, MD  08/30/23 1117

## 2023-09-23 ENCOUNTER — HOSPITAL ENCOUNTER (EMERGENCY)
Facility: CLINIC | Age: 43
Discharge: HOME | End: 2023-09-23
Attending: EMERGENCY MEDICINE | Admitting: EMERGENCY MEDICINE

## 2023-09-23 VITALS
DIASTOLIC BLOOD PRESSURE: 75 MMHG | HEART RATE: 83 BPM | SYSTOLIC BLOOD PRESSURE: 120 MMHG | HEIGHT: 71 IN | BODY MASS INDEX: 25.62 KG/M2 | WEIGHT: 183 LBS | TEMPERATURE: 97.8 F | RESPIRATION RATE: 16 BRPM | OXYGEN SATURATION: 94 %

## 2023-09-23 DIAGNOSIS — A49.02 MRSA INFECTION: ICD-10-CM

## 2023-09-23 DIAGNOSIS — L02.91 ABSCESS: ICD-10-CM

## 2023-09-23 PROCEDURE — 99284 EMERGENCY DEPT VISIT MOD MDM: CPT

## 2023-09-23 RX ORDER — SULFAMETHOXAZOLE/TRIMETHOPRIM 800-160 MG
1 TABLET ORAL 2 TIMES DAILY
Qty: 20 TABLET | Refills: 0 | Status: SHIPPED | OUTPATIENT
Start: 2023-09-23 | End: 2023-10-03

## 2023-09-23 RX ORDER — MUPIROCIN 20 MG/G
OINTMENT TOPICAL 2 TIMES DAILY
Qty: 1 G | Refills: 0 | Status: SHIPPED | OUTPATIENT
Start: 2023-09-23 | End: 2023-09-28

## 2023-09-23 RX ORDER — CHLORHEXIDINE GLUCONATE ORAL RINSE 1.2 MG/ML
15 SOLUTION DENTAL 2 TIMES DAILY
Qty: 118 ML | Refills: 0 | Status: SHIPPED | OUTPATIENT
Start: 2023-09-23

## 2023-09-23 ASSESSMENT — ACTIVITIES OF DAILY LIVING (ADL): ADLS_ACUITY_SCORE: 33

## 2023-09-23 NOTE — ED PROVIDER NOTES
"  History     Chief Complaint:  Wound Infection       The history is provided by the patient.      Jerald Padron is a 43 year old male who presents with abscesses to his lower back, upper buttocks, and left lateral thigh. He has squeezed some of these and drained a small amount of thick \"goo.\" Patient has a history of abscesses beginning November of 2022. Denies alcohol use. Reports some marijuana use and denies all other drugs. Patients does not follow with a PCP. He does not take any regular medications. Patient works as a forrest.    Independent Historian:   None - Patient Only    Review of External Notes:   Chart review    Medications:    Albuterol inhaler   Clindamycin gel    Past Medical History:    Asthma  GERD  Deviated nasal septum   Tobacco use disorder     Past Surgical History:    Irrigation and debridement finger, right  [place] teeth extraction     Physical Exam   Patient Vitals for the past 24 hrs:   BP Temp Temp src Pulse Resp SpO2 Height Weight   09/23/23 0957 -- -- -- -- -- 94 % -- --   09/23/23 0955 120/75 -- -- 83 -- -- -- --   09/23/23 0809 138/79 97.8  F (36.6  C) Temporal 86 16 99 % 1.803 m (5' 11\") 83 kg (183 lb)        Physical Exam  GENERAL: well developed, pleasant  HEAD: atraumatic  EYES: pupils reactive, extraocular muscles intact, conjunctivae normal  ENT:  mucus membranes moist  NECK:  trachea midline, normal range of motion  RESPIRATORY: no tachypnea, breath sounds clear to auscultation   CVS: normal S1/S2, no murmurs, intact distal pulses  ABDOMEN: soft, nontender, nondistention  MUSCULOSKELETAL: no deformities  SKIN: warm and dry, several small pustular nodules on the back and upper buttocks region  NEURO: GCS 15, cranial nerves intact, alert and oriented x3  PSYCH:  Mood/affect normal    Emergency Department Course   Emergency Department Course & Assessments:       Interventions:  Medications - No data to display     Independent Interpretation (X-rays, CTs, rhythm " strip):  None    Assessments/Consultations/Discussion of Management or Tests:  ED Course as of 09/23/23 1002   Sat Sep 23, 2023   0949 I obtained the history and examined the patient as noted above.        Social Determinants of Health affecting care:   None    Disposition:  The patient was discharged to home.     Impression & Plan    Medical Decision Making:  Patient presents with recurrent small abscesses.  He has a history of MRSA in the past.  He has been opening these up and they all seem fairly small.  Discussed likely MRSA and supportive treatment as well as antibiotics.  Discussed ways to decolonize which would consist of: Chlorhexidine daily wash for 5 days, chlorhexidine mouthwash twice a day, mupirocin ointment twice a day.  This regimen is done for 5 days twice a month and is done for 6 months.  This is per up-to-date.  Discussed warm soaks with him.    Diagnosis:    ICD-10-CM    1. Abscess  L02.91       2. MRSA infection  A49.02            Discharge Medications:  New Prescriptions    No medications on file        Scribe Disclosure:  I, Daniela Narayanan, am serving as a scribe at 10:01 AM on 9/23/2023 to document services personally performed by Kris Barr MD based on my observations and the provider's statements to me.     9/23/2023   Kris Barr MD Adams, Shaun L, MD  09/23/23 8794

## 2023-09-23 NOTE — ED TRIAGE NOTES
Patient has a lump to his back, thigh and buttock area, that started 2 days ago. Patient states they are getting bigger and that he has drained them himself in the past.      Triage Assessment       Row Name 09/23/23 0812       Triage Assessment (Adult)    Airway WDL WDL       Respiratory WDL    Respiratory WDL WDL       Skin Circulation/Temperature WDL    Skin Circulation/Temperature WDL X  patient has a lump to his back, buttock, and left outer thigh area.       Cardiac WDL    Cardiac WDL WDL       Peripheral/Neurovascular WDL    Peripheral Neurovascular WDL WDL       Cognitive/Neuro/Behavioral WDL    Cognitive/Neuro/Behavioral WDL WDL

## 2023-10-20 ENCOUNTER — HOSPITAL ENCOUNTER (EMERGENCY)
Facility: CLINIC | Age: 43
Discharge: HOME | End: 2023-10-20
Attending: EMERGENCY MEDICINE | Admitting: EMERGENCY MEDICINE

## 2023-10-20 VITALS
HEART RATE: 92 BPM | SYSTOLIC BLOOD PRESSURE: 152 MMHG | DIASTOLIC BLOOD PRESSURE: 87 MMHG | OXYGEN SATURATION: 95 % | RESPIRATION RATE: 12 BRPM | TEMPERATURE: 97.1 F

## 2023-10-20 DIAGNOSIS — N34.2 URETHRITIS: ICD-10-CM

## 2023-10-20 LAB
ALBUMIN UR-MCNC: 20 MG/DL
APPEARANCE UR: ABNORMAL
BILIRUB UR QL STRIP: NEGATIVE
COLOR UR AUTO: ABNORMAL
GLUCOSE UR STRIP-MCNC: NEGATIVE MG/DL
HGB UR QL STRIP: NEGATIVE
KETONES UR STRIP-MCNC: NEGATIVE MG/DL
LEUKOCYTE ESTERASE UR QL STRIP: ABNORMAL
MUCOUS THREADS #/AREA URNS LPF: PRESENT /LPF
NITRATE UR QL: NEGATIVE
PH UR STRIP: 6.5 [PH] (ref 5–7)
RBC URINE: 17 /HPF
SP GR UR STRIP: 1.02 (ref 1–1.03)
UROBILINOGEN UR STRIP-MCNC: NORMAL MG/DL
WBC CLUMPS #/AREA URNS HPF: PRESENT /HPF
WBC URINE: >182 /HPF

## 2023-10-20 PROCEDURE — 87086 URINE CULTURE/COLONY COUNT: CPT | Performed by: EMERGENCY MEDICINE

## 2023-10-20 PROCEDURE — 87591 N.GONORRHOEAE DNA AMP PROB: CPT | Performed by: EMERGENCY MEDICINE

## 2023-10-20 PROCEDURE — 99283 EMERGENCY DEPT VISIT LOW MDM: CPT

## 2023-10-20 PROCEDURE — 87491 CHLMYD TRACH DNA AMP PROBE: CPT | Performed by: EMERGENCY MEDICINE

## 2023-10-20 PROCEDURE — 81001 URINALYSIS AUTO W/SCOPE: CPT | Performed by: EMERGENCY MEDICINE

## 2023-10-20 ASSESSMENT — ACTIVITIES OF DAILY LIVING (ADL): ADLS_ACUITY_SCORE: 33

## 2023-10-20 NOTE — ED PROVIDER NOTES
"  History     Chief Complaint:  Penile Discharge       The history is provided by the patient.      Jerald Padron is a 43 year old male who presents with penile discharge, penile pain, and pain with urination, beginning 2 days ago. Denies fever, abdominal pain, or blood in discharge. States he has observed a tan purulent fluid, which dries as a brown \"wax.\" He is concerned for STDs. States symptoms began the day after a sexual encounter with a new partner. Denies personal history of STDs.    Independent Historian:   None - Patient Only        Medications:    Albuterol inhaler     Past Medical History:    Asthma  Deviated nasal septum  GERD    Past Surgical History:    Irrigation and debridement, right finger    Physical Exam   Patient Vitals for the past 24 hrs:   BP Temp Temp src Pulse Resp SpO2   10/20/23 0222 (!) 152/87 97.1  F (36.2  C) Temporal 92 12 95 %        Physical Exam  General: Appears well-developed and well-nourished.   Head: No signs of trauma.   CV: Normal rate and regular rhythm.    Resp: Effort normal. No respiratory distress.   GI: Soft. There is no tenderness.  No rebound or guarding.  Normal bowel sounds.  No CVA tenderness.  :  Penile discharge without blood noted.  No sores, rashes noted.  No tenderness to penis or testicles.   MSK: Normal range of motion.   Neuro: The patient is alert and oriented. Speech normal.  Skin: Skin is warm and dry. No rash noted.   Psych: normal mood and affect. behavior is normal.       Emergency Department Course   Laboratory:  Labs Ordered and Resulted from Time of ED Arrival to Time of ED Departure   ROUTINE UA WITH MICROSCOPIC REFLEX TO CULTURE - Abnormal       Result Value    Color Urine Orange (*)     Appearance Urine Slightly Cloudy (*)     Glucose Urine Negative      Bilirubin Urine Negative      Ketones Urine Negative      Specific Gravity Urine 1.025      Blood Urine Negative      pH Urine 6.5      Protein Albumin Urine 20 (*)     Urobilinogen " Urine Normal      Nitrite Urine Negative      Leukocyte Esterase Urine Large (*)     WBC Clumps Urine Present (*)     Mucus Urine Present (*)     RBC Urine 17 (*)     WBC Urine >182 (*)    CHLAMYDIA TRACHOMATIS PCR   NEISSERIA GONORRHOEAE PCR   URINE CULTURE     Emergency Department Course & Assessments:       Interventions:  Medications - No data to display     Independent Interpretation (X-rays, CTs, rhythm strip):  None    Assessments/Consultations/Discussion of Management or Tests:  ED Course as of 10/20/23 0809   Fri Oct 20, 2023   3161 I obtained the history and examined the patient as noted above.        Social Determinants of Health affecting care:   None    Disposition:  The patient was discharged to home.     Impression & Plan    Medical Decision Making:  Jerald Padron presents due to penile discharge and discomfort with urination.  He does report a new sexual contact and is concerned for an STD.  Patient's history and exam would be consistent with urethritis.  Chlamydia and gonorrhea testing is pending.  UA did show many white blood cells, which would be expected with the discharge.  Clinically I do not suspect a bladder infection.  Patient is able to reliably follow-up, and discussed while I believe it is likely he has either gonorrhea or chlamydia, current recommendations are to obtain results in order to focus treatment to help prevent over exposure to antibiotics and resistance.  Patient states understanding and states that he would follow on MyChart.  I discussed that oral antibiotics could be called in versus going to urgent care or clinic for IM antibiotics if indicated.  I did discuss the importance of following up with his doctor as well for further STD testing such as HIV, hepatitis, and syphilis.    Diagnosis:    ICD-10-CM    1. Urethritis  N34.2            Discharge Medications:  Discharge Medication List as of 10/20/2023  5:27 AM         Scribe Disclosure:  I, Daniela Narayanan, am serving  as a scribe at 4:42 AM on 10/20/2023 to document services personally performed by Ken Gomez MD based on my observations and the provider's statements to me.     10/20/2023   Ken Gomez MD Bergenstal, John A, MD  10/20/23 0812

## 2023-10-20 NOTE — DISCHARGE INSTRUCTIONS
Your chlamadyia and ghonrehhea tests are in process.  Please sign up for MyChart so that you can get the results as soon as they return.  If one is positive, please follow up in urgent care or in the ER for treatment.  Please follow up in clinic for further evaluation if the testing is negative.

## 2023-10-20 NOTE — ED TRIAGE NOTES
Patient here  with penile discharge which started 2 days ago. He would like  an STD check     Triage Assessment (Adult)       Row Name 10/20/23 0222          Triage Assessment    Airway WDL WDL        Respiratory WDL    Respiratory WDL WDL        Skin Circulation/Temperature WDL    Skin Circulation/Temperature WDL WDL        Cardiac WDL    Cardiac WDL WDL        Peripheral/Neurovascular WDL    Peripheral Neurovascular WDL WDL        Cognitive/Neuro/Behavioral WDL    Cognitive/Neuro/Behavioral WDL WDL

## 2023-10-21 ENCOUNTER — TELEPHONE (OUTPATIENT)
Dept: EMERGENCY MEDICINE | Facility: CLINIC | Age: 43
End: 2023-10-21

## 2023-10-21 LAB
BACTERIA UR CULT: NO GROWTH
C TRACH DNA SPEC QL NAA+PROBE: NEGATIVE
N GONORRHOEA DNA SPEC QL NAA+PROBE: POSITIVE

## 2023-10-21 RX ORDER — CEFIXIME 400 MG/1
800 CAPSULE ORAL ONCE
Qty: 2 CAPSULE | Refills: 0 | Status: SHIPPED | OUTPATIENT
Start: 2023-10-21 | End: 2023-10-21

## 2023-10-21 NOTE — LETTER
October 21, 2023        Jerald Padron  [address] [place] [address]          Dear Jerald Padron:    You were seen in the [facility] Emergency Department at [facility] EMERGENCY DEPT on 10/21/2023.  We are unable to reach you by phone, so we are sending you this letter.     It is important that you call [facility] Emergency Department lab result nurse at [phone], as we have information to relay to you AND/OR we MAY have to make some changes in your treatment.    Best time to call back is between 9AM and 5:30PM, 7 days a week.      Sincerely,     [facility] Emergency Department Lab Result RN  [phone]

## 2023-10-21 NOTE — TELEPHONE ENCOUNTER
[facility] Emergency Department/Urgent Care Lab result notification  [Note:  ED Lab Results RN will reference the [facility] Emergency Dept visit note prior to contacting patient AND/OR prior to consulting Emergency Dept Provider.  Highlights of Emergency Dept visit in information summary at the bottom of this telephone note]    1. Reason for call  Jerald is return ED lab result RN Call    2. Lab Result (including Rx patient on, if applicable).  If culture, copy of lab report at bottom.  Final N. Gonorrhoeae PCR is POSITIVE. (Specimen source: Urine)  Patient was treated appropriately in the ED [Yes or No]:  No      [facility] Emergency Dept discharge antibiotic prescribed [if applicable]: None  Recommendations in treatment per [facility] ED Lab Result N. Gonorrhea protocol  Component      Latest Ref Rng 10/20/2023  4:33 AM   Chlamydia Trachomatis PCR      Negative  Negative    N Gonorrhea PCR      Negative  Positive !       Legend:  ! Abnormal  3. RN Assessment (Patient's current Symptoms):  Time of call: 4:24PM  Assessment: NA    4. RN Recommendations/Instructions per [facility] ED lab result protocol  [facility] ED lab result protocol used: N Gonorrhea  Jerald was notified of lab result and treatment recommendations  Start or switch to Rx for Cefixime (Suprax) 400 mg tablet, 2 tablet (800 mg) by mouth x 1  sent to [Pharmacy - [facility]].    RN reviewed information about STD Patient Instructions:  We recommend that you contact any recent sexual partners within the last 2 months and have them evaluated by a physician.  Avoid sexual activity for 7 to 10 days or until both you and your partner(s) have completed all antibiotic medications.  Contact PCP or return to the Emergency Dept within 24 hours if you are experiencing persistent or recurrent symptoms soon after completing therapy.  We advise that you consider following up with your PCP at approximately 3 months for  retesting to be sure the infection has cleared.    5. Please Contact your PCP clinic or return to the Emergency department if your:  Symptoms do not improve after 3 days on antibiotic.  Symptoms do not resolve after completing antibiotic.  Symptoms worsen or other concerning symptoms.      Axel Joyner RN  [facility]  Emergency Dept Lab Result RN  Ph# [phone]

## 2023-10-21 NOTE — TELEPHONE ENCOUNTER
"[facility] Emergency Department/Urgent Care Lab result notification  [Note:  ED Lab Results RN will reference the [facility] Emergency Dept visit note prior to contacting patient AND/OR prior to consulting Emergency Dept Provider.  Highlights of Emergency Dept visit in information summary at the bottom of this telephone note]    1. Reason for call  Notify of lab results  Assess patient symptoms [if necessary]  Review ED Providers recommendations/discharge instructions (if necessary)  Advise per [facility] ED lab result protocol    2. Lab Result (including Rx patient on, if applicable).  If culture, copy of lab report at bottom.  Final N. Gonorrhoeae PCR is POSITIVE. (Specimen source: Urine)  Patient was treated appropriately in the ED [Yes or No]:  No      [facility] Emergency Dept discharge antibiotic prescribed [if applicable]: None  Recommendations in treatment per [facility] ED Lab Result N. Gonorrhea protocol    3. RN Assessment (Patient's current Symptoms):  Time of call: 10:40am no answer and voicemail is not set up.     4. RN Recommendations/Instructions per [facility] ED lab result protocol  [facility] ED lab result protocol used: N.gonorrhea  Unable to leave voicemail message requesting a call back to [facility] ED Lab Result RN at [phone].  RN is available every day between 9 a.m. and 5:30 p.m.    Information summary from Emergency Dept/Urgent Care visit on 10/20/23  Symptoms reported at ED/UC visit (Chief complaint, HPI) Chief Complaint:  Penile Discharge     The history is provided by the patient.      Jerald Padron is a 43 year old male who presents with penile discharge, penile pain, and pain with urination, beginning 2 days ago. Denies fever, abdominal pain, or blood in discharge. States he has observed a tan purulent fluid, which dries as a brown \"wax.\" He is concerned for STDs. States symptoms began the day after a sexual encounter " with a new partner. Denies personal history of STDs.   Significant Medical hx, if applicable (i.e. CKD, diabetes) Reviewed   Allergies No Known Allergies   Weight, if applicable Wt Readings from Last 2 Encounters:   09/23/23 83 kg (183 lb)   08/23/23 83.9 kg (185 lb)      Coumadin/Warfarin [Yes /No] No   Creatinine Level (mg/dl) Creatinine   Date Value Ref Range Status   11/29/2022 1.17 0.66 - 1.25 mg/dL Final      Creatinine clearance (ml/min), if applicable Serum creatinine: 1.17 mg/dL 11/29/22 0900  Estimated creatinine clearance: 96.6 mL/min   ED/UC Provider Impression and Plan (applicable information) Medical Decision Making:  Jerald Padron presents due to penile discharge and discomfort with urination.  He does report a new sexual contact and is concerned for an STD.  Patient's history and exam would be consistent with urethritis.  Chlamydia and gonorrhea testing is pending.  UA did show many white blood cells, which would be expected with the discharge.  Clinically I do not suspect a bladder infection.  Patient is able to reliably follow-up, and discussed while I believe it is likely he has either gonorrhea or chlamydia, current recommendations are to obtain results in order to focus treatment to help prevent over exposure to antibiotics and resistance.  Patient states understanding and states that he would follow on MyChart.  I discussed that oral antibiotics could be called in versus going to urgent care or clinic for IM antibiotics if indicated.  I did discuss the importance of following up with his doctor as well for further STD testing such as HIV, hepatitis, and syphilis.   ED/UC diagnosis Urethritis    ED/UC Provider Ken Gomez MD        Copy of Lab report (if applicable)  Component      Latest Ref Rng 10/20/2023  4:33 AM   N Gonorrhea PCR      Negative  Positive !       Legend:  ! Abnormal      David Davis RN  [facility]  Emergency Dept Lab Result  TD  # [phone]

## 2023-10-21 NOTE — TELEPHONE ENCOUNTER
[facility] Emergency Department Lab result notification:    Reason for Letter being mailed out:    Lab result (gonorrhea) is positive and Patient is untreated.  Unable to reach via telephone so letter sent with a message requesting a call back to [phone] between 9 a.m. and 5:30 p.m., 7 days a week for patient's ED/UC lab results.   Lab result:  Component      Latest Ref Rng 10/20/2023  4:33 AM   N Gonorrhea PCR      Negative  Positive !       Legend:  ! Abnormal.    David Davis, RN  [facility]  Emergency Dept Lab Result RN  Ph# [phone]

## 2024-03-09 ENCOUNTER — HOSPITAL ENCOUNTER (EMERGENCY)
Facility: CLINIC | Age: 44
Discharge: HOME | End: 2024-03-09
Attending: EMERGENCY MEDICINE | Admitting: EMERGENCY MEDICINE

## 2024-03-09 VITALS
DIASTOLIC BLOOD PRESSURE: 100 MMHG | BODY MASS INDEX: 25.9 KG/M2 | WEIGHT: 185 LBS | OXYGEN SATURATION: 98 % | HEART RATE: 87 BPM | HEIGHT: 71 IN | RESPIRATION RATE: 18 BRPM | TEMPERATURE: 98.6 F | SYSTOLIC BLOOD PRESSURE: 159 MMHG

## 2024-03-09 DIAGNOSIS — N34.2 URETHRITIS: ICD-10-CM

## 2024-03-09 DIAGNOSIS — L02.31 LEFT BUTTOCK ABSCESS: ICD-10-CM

## 2024-03-09 LAB — HIV 1+2 AB+HIV1 P24 AG SERPL QL IA: NONREACTIVE

## 2024-03-09 PROCEDURE — 250N000009 HC RX 250: Performed by: EMERGENCY MEDICINE

## 2024-03-09 PROCEDURE — 250N000011 HC RX IP 250 OP 636: Performed by: EMERGENCY MEDICINE

## 2024-03-09 PROCEDURE — 99284 EMERGENCY DEPT VISIT MOD MDM: CPT

## 2024-03-09 PROCEDURE — 96372 THER/PROPH/DIAG INJ SC/IM: CPT | Performed by: EMERGENCY MEDICINE

## 2024-03-09 PROCEDURE — 87389 HIV-1 AG W/HIV-1&-2 AB AG IA: CPT | Performed by: EMERGENCY MEDICINE

## 2024-03-09 PROCEDURE — 36415 COLL VENOUS BLD VENIPUNCTURE: CPT | Performed by: EMERGENCY MEDICINE

## 2024-03-09 PROCEDURE — 86780 TREPONEMA PALLIDUM: CPT | Performed by: EMERGENCY MEDICINE

## 2024-03-09 PROCEDURE — 87591 N.GONORRHOEAE DNA AMP PROB: CPT | Performed by: EMERGENCY MEDICINE

## 2024-03-09 PROCEDURE — 87491 CHLMYD TRACH DNA AMP PROBE: CPT | Performed by: EMERGENCY MEDICINE

## 2024-03-09 PROCEDURE — 87070 CULTURE OTHR SPECIMN AEROBIC: CPT | Performed by: EMERGENCY MEDICINE

## 2024-03-09 RX ORDER — DOXYCYCLINE 100 MG/1
100 CAPSULE ORAL 2 TIMES DAILY
Qty: 28 CAPSULE | Refills: 0 | Status: SHIPPED | OUTPATIENT
Start: 2024-03-09 | End: 2024-03-23

## 2024-03-09 RX ADMIN — LIDOCAINE HYDROCHLORIDE 500 MG: 10 INJECTION, SOLUTION EPIDURAL; INFILTRATION; INTRACAUDAL; PERINEURAL at 14:03

## 2024-03-09 ASSESSMENT — COLUMBIA-SUICIDE SEVERITY RATING SCALE - C-SSRS
2. HAVE YOU ACTUALLY HAD ANY THOUGHTS OF KILLING YOURSELF IN THE PAST MONTH?: NO
1. IN THE PAST MONTH, HAVE YOU WISHED YOU WERE DEAD OR WISHED YOU COULD GO TO SLEEP AND NOT WAKE UP?: NO
6. HAVE YOU EVER DONE ANYTHING, STARTED TO DO ANYTHING, OR PREPARED TO DO ANYTHING TO END YOUR LIFE?: NO

## 2024-03-09 ASSESSMENT — ACTIVITIES OF DAILY LIVING (ADL)
ADLS_ACUITY_SCORE: 37

## 2024-03-09 NOTE — ED PROVIDER NOTES
"    History     Chief Complaint:  Wound Infection and Exposure to STD       HPI   Jerald TAMIKA Padron is a 43 year old male who presents to the ED for evaluation for urethral discharge.  He had unprotected intercourse 7 days ago with a new female sexual partner.  He was treated for gonorrhea last year.  His symptoms seem similar now with thick urethral discharge over the last 2 days.  He has urgency urination but is able to void his bladder.  No fever, chills, myalgia, vomiting.  The patient also has had prior MRSA infections.  He has a wound on the left buttock which he feels is similar to prior soft tissue infection.  He says the wound is spontaneously draining at home.      Review of External Notes:  Prior notes reviewed from October 20, 2023 when the patient was seen for gonorrhea urethritis.    Prior notes reviewed from September 23, 2023 the patient was seen for an MRSA abscess    Medications:    doxycycline hyclate (VIBRAMYCIN) 100 MG capsule  albuterol (PROAIR HFA/PROVENTIL HFA/VENTOLIN HFA) 108 (90 Base) MCG/ACT inhaler  chlorhexidine (HIBICLENS) 4 % liquid  chlorhexidine (PERIDEX) 0.12 % solution  clindamycin (CLINDAMAX) 1 % external gel  ondansetron (ZOFRAN ODT) 4 MG ODT tab        Past Medical History:    Past Medical History:   Diagnosis Date    Asthma     Deviated nasal septum     Gastroesophageal reflux disease with esophagitis        Past Surgical History:    Past Surgical History:   Procedure Laterality Date    IRRIGATION AND DEBRIDEMENT FINGER, COMBINED Right 11/29/2022    Procedure: IRRIGATION AND DEBRIDEMENT, right middle finger;  Surgeon: John Vasquez MD;  Location:  OR          Physical Exam   Patient Vitals for the past 24 hrs:   BP Temp Temp src Pulse Resp SpO2 Height Weight   03/09/24 1123 (!) 159/100 98.6  F (37  C) Oral 87 18 98 % 1.803 m (5' 11\") 83.9 kg (185 lb)        Physical Exam  Constitutional:       General: He is not in acute distress.     Appearance: Normal " appearance. He is not toxic-appearing.   HENT:      Head: Atraumatic.      Left Ear: External ear normal.   Eyes:      General: No scleral icterus.     Conjunctiva/sclera: Conjunctivae normal.   Cardiovascular:      Rate and Rhythm: Normal rate and regular rhythm.      Heart sounds: Normal heart sounds.   Pulmonary:      Effort: Pulmonary effort is normal. No respiratory distress.      Breath sounds: Normal breath sounds.   Abdominal:      General: There is no distension.      Palpations: Abdomen is soft.      Tenderness: There is no abdominal tenderness.   Genitourinary:     Comments: No inguinal adenopathy.  No external lesions.  Uncircumcised.  There is thick, purulent urethral discharge.  No tenderness of the scrotum, epididymis, or testicles.  Musculoskeletal:         General: No deformity.      Cervical back: Neck supple.   Skin:     General: Skin is warm.      Capillary Refill: Capillary refill takes less than 2 seconds.      Comments: There is a small area of erythema measuring approximately 2-1/2 cm over the left buttock.  There is a central wound that is draining purulent bloody material.  There is induration with fluctuance.  This is tender to palpation.  No crepitance.  The wound is well away from the anus.   Neurological:      General: No focal deficit present.      Mental Status: He is alert and oriented to person, place, and time.   Psychiatric:         Mood and Affect: Mood normal.         Behavior: Behavior normal.           Emergency Department Course     Emergency Department Course & Assessments:    Interventions:  Medications   cefTRIAXone (ROCEPHIN) 500 mg in lidocaine injection (has no administration in time range)       Disposition:  The patient was discharged.    Impression & Plan      Medical Decision Making:  This patient presents with urethritis approximately 1 week after unprotected intercourse.  He was previously treated for gonorrhea last year.  He was given empiric ceftriaxone.  Will  add doxycycline for home use.  Syphilis and HIV test were sent and are pending.    Patient also has a soft tissue abscess of the left buttock.  This is spontaneously draining but has persistent fluctuance and induration.  I recommended incision and drainage which the patient adamantly refused.  We discussed potential for extension to a deep space infection and disability related to this.  The patient acknowledges but still declines an I&D.  He was advised immediately to return if there is any increased swelling.  I stated he will be on doxycycline.  Wound culture was sent.    The patient was advised to always use barrier protection during intercourse.    Diagnosis:    ICD-10-CM    1. Urethritis  N34.2       2. Left buttock abscess  L02.31            Discharge Medications:  New Prescriptions    DOXYCYCLINE HYCLATE (VIBRAMYCIN) 100 MG CAPSULE    Take 1 capsule (100 mg) by mouth 2 times daily for 14 days        3/9/2024   Gerard Lo MD McRoberts, Sean Edward, MD  03/09/24 7638

## 2024-03-09 NOTE — DISCHARGE INSTRUCTIONS
Return to the ER for increased redness or swelling of the wound on your buttock, fever, increased urethral discharge, abdominal pain, inability urinate, or any new concerns.    You should always use a condom during intercourse.

## 2024-03-10 ENCOUNTER — TELEPHONE (OUTPATIENT)
Dept: EMERGENCY MEDICINE | Facility: CLINIC | Age: 44
End: 2024-03-10

## 2024-03-10 LAB
C TRACH DNA SPEC QL NAA+PROBE: NEGATIVE
N GONORRHOEA DNA SPEC QL NAA+PROBE: POSITIVE
T PALLIDUM AB SER QL: NONREACTIVE

## 2024-03-10 NOTE — LETTER
March 10, 2024        Jerald Padron  [address] [place] [place] [address]          Dear Jerald Padron:    You were seen in the [facility] Emergency Department at [facility] EMERGENCY DEPT on 3/9/2024.  We are unable to reach you by phone, so we are sending you this letter.     It is important that you call [facility] Emergency Department lab result nurse at [phone], as we have information to relay to you AND/OR we MAY have to make some changes in your treatment.    Best time to call back is between 9AM and 5:30PM, 7 days a week.      Sincerely,     [facility] Emergency Department Lab Result RN  [phone]

## 2024-03-10 NOTE — TELEPHONE ENCOUNTER
[facility]    Reason for call: Lab Result Notification     Lab Result (including Rx patient on, if applicable).  If culture, copy of lab report at bottom.  Lab Result: Final N. Gonorrhoeae PCR is POSITIVE. (Specimen source: Urine)  Patient was treated appropriately in the ED [Yes or No]:  Yes      If Yes, list what was given in the ED:  500 Rocephin  [facility] Emergency Dept discharge antibiotic prescribed [if applicable]: Doxycycline 100 mg PO tablet, 1 tablet (100 mg) by mouth 2 times daily for 14 days     Recommendations in treatment per [facility] ED Lab Result N. Gonorrhea protocol    Creatinine Level (mg/dl)   Creatinine   Date Value Ref Range Status   11/29/2022 1.17 0.66 - 1.25 mg/dL Final    Creatinine clearance (ml/min), if applicable    Creatinine clearance cannot be calculated (Patient's most recent lab result is older than the maximum 365 days allowed.)     Patient's current Symptoms:   Unable to LM, VM not set up. Letter sent    Appropriately treated in ED, would need STD education and follow up recommendations     Pt returned call. Educated on below and recommended PCP follow up for retesting in 3 months     STD Patient Instructions:  We recommend that you contact any recent sexual partners within the last 2 months and have them evaluated by a physician.  Avoid sexual activity for 7 to 10 days or until both you and your partner(s) have completed all antibiotic medications.  Contact PCP or return to the Emergency Dept within 24 hours if you are experiencing persistent or recurrent symptoms soon after completing therapy.  We advise that you consider following up with your PCP at approximately 3 months for retesting to be sure the infection has cleared.     Florencio Rainey RN     Component      Latest Ref Rng 3/9/2024  1:21 PM   N Gonorrhea PCR      Negative  Positive !       Legend:  ! Abnormal

## 2024-03-11 LAB
BACTERIA ABSC ANAEROBE+AEROBE CULT: ABNORMAL
GRAM STAIN RESULT: ABNORMAL
GRAM STAIN RESULT: ABNORMAL

## 2024-03-12 ENCOUNTER — TELEPHONE (OUTPATIENT)
Dept: EMERGENCY MEDICINE | Facility: CLINIC | Age: 44
End: 2024-03-12

## 2024-03-12 NOTE — TELEPHONE ENCOUNTER
[facility]    Reason for call: Lab Result Notification     Lab Result (including Rx patient on, if applicable).  If culture, copy of lab report at bottom.  Lab Result: Final abscess Aerobic Bacterial Culture (specimen - buttock, left; abscess) report on 3/11/24  [facility] Emergency Dept discharge antibiotic prescribed: Doxycycline 100 mg PO tablet, 1 tablet (100 mg) by mouth 2 times daily for 14 days   #1. Bacteria,  Methicillin resistant Staphylococcus aureus (MRSA)  ,  is [SUSCEPTIBLE] to antibiotic.  Incision and Drainage performed in the [facility] ED: no  Recommendations in treatment per [facility] ED lab result culture protocol    Creatinine Level (mg/dl)   Creatinine   Date Value Ref Range Status   11/29/2022 1.17 0.66 - 1.25 mg/dL Final    Creatinine clearance (ml/min), if applicable    Creatinine clearance cannot be calculated (Patient's most recent lab result is older than the maximum 365 days allowed.)     Patient's current Symptoms:   Pt reports the wound being much better.   Discussed s/s infection, follow up as necessary. Pt agreeable.     RN Recommendations/Instructions per [facility] ED lab result protocol:   [facility] ED lab result protocol utilized: Culture    Patient/care giver notified to contact your PCP clinic or return to the Emergency department if your:  Symptoms return.  Symptoms do not improve after 3 days on antibiotic.  Symptoms do not resolve after completing antibiotic.  Symptoms worsen or other concerning symptoms.        Rebecca Mallory, RN

## 2024-07-30 ENCOUNTER — HOSPITAL ENCOUNTER (EMERGENCY)
Facility: CLINIC | Age: 44
Discharge: HOME | End: 2024-07-30
Attending: EMERGENCY MEDICINE | Admitting: EMERGENCY MEDICINE

## 2024-07-30 VITALS
OXYGEN SATURATION: 97 % | RESPIRATION RATE: 16 BRPM | HEART RATE: 77 BPM | DIASTOLIC BLOOD PRESSURE: 91 MMHG | BODY MASS INDEX: 25.06 KG/M2 | TEMPERATURE: 97.5 F | SYSTOLIC BLOOD PRESSURE: 144 MMHG | HEIGHT: 72 IN | WEIGHT: 185 LBS

## 2024-07-30 DIAGNOSIS — L02.91 ABSCESS: ICD-10-CM

## 2024-07-30 DIAGNOSIS — L02.31 ABSCESS AND CELLULITIS OF GLUTEAL REGION: ICD-10-CM

## 2024-07-30 DIAGNOSIS — L03.317 ABSCESS AND CELLULITIS OF GLUTEAL REGION: ICD-10-CM

## 2024-07-30 PROCEDURE — 99283 EMERGENCY DEPT VISIT LOW MDM: CPT

## 2024-07-30 RX ORDER — DOXYCYCLINE HYCLATE 100 MG
100 TABLET ORAL 2 TIMES DAILY
Qty: 20 TABLET | Refills: 0 | Status: SHIPPED | OUTPATIENT
Start: 2024-07-30 | End: 2024-08-09

## 2024-07-30 ASSESSMENT — ACTIVITIES OF DAILY LIVING (ADL)
ADLS_ACUITY_SCORE: 35
ADLS_ACUITY_SCORE: 35
ADLS_ACUITY_SCORE: 37

## 2024-07-30 NOTE — DISCHARGE INSTRUCTIONS
As we discussed it does look like you have an abscess forming in the left side of your gluteus, and this possibly may be connected to the anal cavity or possibly even the surrounding muscles near the rectum.  There was a recommendation to at least do a needle aspiration to attempt to get as much pus out as we can, though you have politely declined this and declined any imaging to see how deep this goes.  We are giving you the antibiotics of course, but please do be certain that you check in with your regular doctor to make sure that things are getting better in the next 3 to 4 days.  Come back with any other concerns you have or any new symptoms.

## 2024-07-30 NOTE — ED TRIAGE NOTES
Patient has a red raised bump on skin on left glute area.      Triage Assessment (Adult)       Row Name 07/30/24 0356          Triage Assessment    Airway WDL WDL        Respiratory WDL    Respiratory WDL WDL        Skin Circulation/Temperature WDL    Skin Circulation/Temperature WDL X  patient has red raised area, on right glute        Cardiac WDL    Cardiac WDL WDL        Peripheral/Neurovascular WDL    Peripheral Neurovascular WDL WDL        Cognitive/Neuro/Behavioral WDL    Cognitive/Neuro/Behavioral WDL WDL

## 2024-07-30 NOTE — ED PROVIDER NOTES
"  Emergency Department Note      History of Present Illness     Chief Complaint  Wound Infection    HPI  Jerald Padron is a 44 year old male who presents to the emergency room with what appears to be an abscess on his left gluteus.  He states that he has had recurrent abscesses here, but is decidedly not interested in any type of incision or drainage or aspiration.  He states he just wants antibiotics and wants to go home.  He states that he had about 10 of these infections in the last 2 years and antibiotics typically resolve his symptoms.  Denies any fevers, states he is having normal bowel movements, no abdominal pain.      Independent Historian  No    Review of External Notes  Yes I have reviewed his last ER visit on 9 March of this year the patient was seen for urethritis as well as a left buttock abscess.  At that visit when he had an abscess in the exact same location, he also adamantly refused I&D.      Past Medical History   Medical History and Problem List  Past Medical History:   Diagnosis Date    Asthma     Deviated nasal septum     Gastroesophageal reflux disease with esophagitis        Medications  doxycycline hyclate (VIBRA-TABS) 100 MG tablet  albuterol (PROAIR HFA/PROVENTIL HFA/VENTOLIN HFA) 108 (90 Base) MCG/ACT inhaler  chlorhexidine (HIBICLENS) 4 % liquid  chlorhexidine (PERIDEX) 0.12 % solution  clindamycin (CLINDAMAX) 1 % external gel  ondansetron (ZOFRAN ODT) 4 MG ODT tab        Surgical History   Past Surgical History:   Procedure Laterality Date    IRRIGATION AND DEBRIDEMENT FINGER, COMBINED Right 11/29/2022    Procedure: IRRIGATION AND DEBRIDEMENT, right middle finger;  Surgeon: John Vasquez MD;  Location:  OR         Physical Exam   Patient Vitals for the past 24 hrs:   BP Temp Temp src Pulse Resp SpO2 Height Weight   07/30/24 0354 (!) 144/91 97.5  F (36.4  C) Temporal 77 16 97 % 1.822 m (5' 11.75\") 83.9 kg (185 lb)       Physical Exam  Vitals: reviewed by me  General: " Pt seen on hospital [facility], pleasant, cooperative, and alert to conversation  Eyes: Tracking well, clear conjunctiva BL  ENT: MMM, midline trachea.   Lungs: No tachypnea, no accessory muscle use. No respiratory distress.   CV: Rate as above  : Left buttock with large area of induration and tenderness, does have mild superficial draining, but is fluctuant and erythematous, and fairly close to the anal verge.  Possible fistula noted on the outside.  MSK: no joint effusion.  No evidence of trauma  Skin: No rash  Neuro: Clear speech and no facial droop.  Psych: Not RIS, no e/o AH/          ED Course            Optional/Additional Documentation  Stress/Adjustment Disorders      Medical Decision Making / Diagnosis         MDM  This is a very pleasant 44-year-old male who presents the emergency room with a left buttock abscess.  I can see that he had recent infections in this area before and I did recommend at least a needle aspiration to help with the fluctuant aspect of this abscess and he continues to adamantly refuse.  It sounds like this is something he typically does and he is simply asking for antibiotics.  He understands that if he has an abscess antibiotics are not likely to be effective, although it is possible that he has spontaneously drained or will continue to spontaneously drain.  He states he feels comfortable trying to incise the incision at home, which of course I did not recommend.  I also did recommend a CT scan to see how deep this goes since it is a recurrent abscess in the same spot and was never properly treated, and again he adamantly refuses any imaging.  Red flags when to come back to the ER were discussed in detail.    ICD-10 Codes:    ICD-10-CM    1. Abscess  L02.91       2. Abscess and cellulitis of gluteal region  L02.31     L03.317              Discharge Medications  New Prescriptions    DOXYCYCLINE HYCLATE (VIBRA-TABS) 100 MG TABLET    Take 1 tablet (100 mg) by mouth 2 times daily for  10 days                  Dae Benitez MD  07/30/24 0649

## (undated) DEVICE — SU ETHILON 4-0 PS-2 18" 1667G

## (undated) DEVICE — SYR BULB IRRIG DOVER 60 ML LATEX FREE 67000

## (undated) DEVICE — SOL NACL 0.9% IRRIG 1000ML BOTTLE 2F7124

## (undated) DEVICE — PACK HAND WRIST SOP15HWFSP

## (undated) DEVICE — CAST PADDING 4" STERILE 9044S

## (undated) DEVICE — DRAPE SHEET REV FOLD 3/4 9349

## (undated) DEVICE — ESU GROUND PAD ADULT W/CORD E7507

## (undated) DEVICE — PREP SKIN SCRUB TRAY 4461A

## (undated) RX ORDER — VANCOMYCIN HYDROCHLORIDE 1 G/200ML
INJECTION, SOLUTION INTRAVENOUS
Status: DISPENSED
Start: 2022-11-29

## (undated) RX ORDER — HYDROMORPHONE HCL IN WATER/PF 6 MG/30 ML
PATIENT CONTROLLED ANALGESIA SYRINGE INTRAVENOUS
Status: DISPENSED
Start: 2022-11-29

## (undated) RX ORDER — FENTANYL CITRATE 0.05 MG/ML
INJECTION, SOLUTION INTRAMUSCULAR; INTRAVENOUS
Status: DISPENSED
Start: 2022-11-29

## (undated) RX ORDER — PIPERACILLIN SODIUM, TAZOBACTAM SODIUM 3; .375 G/15ML; G/15ML
INJECTION, POWDER, LYOPHILIZED, FOR SOLUTION INTRAVENOUS
Status: DISPENSED
Start: 2022-11-29

## (undated) RX ORDER — DEXAMETHASONE SODIUM PHOSPHATE 4 MG/ML
INJECTION, SOLUTION INTRA-ARTICULAR; INTRALESIONAL; INTRAMUSCULAR; INTRAVENOUS; SOFT TISSUE
Status: DISPENSED
Start: 2022-11-29

## (undated) RX ORDER — FENTANYL CITRATE 50 UG/ML
INJECTION, SOLUTION INTRAMUSCULAR; INTRAVENOUS
Status: DISPENSED
Start: 2022-11-29

## (undated) RX ORDER — ONDANSETRON 2 MG/ML
INJECTION INTRAMUSCULAR; INTRAVENOUS
Status: DISPENSED
Start: 2022-11-29